# Patient Record
Sex: FEMALE | NOT HISPANIC OR LATINO | Employment: UNEMPLOYED
[De-identification: names, ages, dates, MRNs, and addresses within clinical notes are randomized per-mention and may not be internally consistent; named-entity substitution may affect disease eponyms.]

---

## 2018-02-22 ENCOUNTER — HOSPITAL (OUTPATIENT)
Dept: OTHER | Age: 38
End: 2018-02-22
Attending: LEGAL MEDICINE

## 2018-04-02 ENCOUNTER — HOSPITAL (OUTPATIENT)
Dept: OTHER | Age: 38
End: 2018-04-02
Attending: LEGAL MEDICINE

## 2018-04-02 LAB
ANALYZER ANC (IANC): ABNORMAL
BASOPHILS # BLD: 0 THOUSAND/MCL (ref 0–0.3)
BASOPHILS NFR BLD: 0 %
DIFFERENTIAL METHOD BLD: ABNORMAL
EOSINOPHIL # BLD: 0.2 THOUSAND/MCL (ref 0.1–0.5)
EOSINOPHIL NFR BLD: 1 %
ERYTHROCYTE [DISTWIDTH] IN BLOOD: 14 % (ref 11–15)
HEMATOCRIT: 37.7 % (ref 36–46.5)
HGB BLD-MCNC: 12.4 GM/DL (ref 12–15.5)
LYMPHOCYTES # BLD: 2.3 THOUSAND/MCL (ref 1–4.8)
LYMPHOCYTES NFR BLD: 14 %
MCH RBC QN AUTO: 28.6 PG (ref 26–34)
MCHC RBC AUTO-ENTMCNC: 32.9 GM/DL (ref 32–36.5)
MCV RBC AUTO: 86.9 FL (ref 78–100)
MONOCYTES # BLD: 0.7 THOUSAND/MCL (ref 0.3–0.9)
MONOCYTES NFR BLD: 5 %
NEUTROPHILS # BLD: 13 THOUSAND/MCL (ref 1.8–7.7)
NEUTROPHILS NFR BLD: 80 %
NEUTS SEG NFR BLD: ABNORMAL %
PERCENT NRBC: ABNORMAL
PLATELET # BLD: 347 THOUSAND/MCL (ref 140–450)
RBC # BLD: 4.34 MILLION/MCL (ref 4–5.2)
WBC # BLD: 16.2 THOUSAND/MCL (ref 4.2–11)

## 2018-04-04 LAB
ANALYZER ANC (IANC): ABNORMAL
ERYTHROCYTE [DISTWIDTH] IN BLOOD: 14.4 % (ref 11–15)
HEMATOCRIT: 31.6 % (ref 36–46.5)
HGB BLD-MCNC: 10.1 GM/DL (ref 12–15.5)
MCH RBC QN AUTO: 28.5 PG (ref 26–34)
MCHC RBC AUTO-ENTMCNC: 32 GM/DL (ref 32–36.5)
MCV RBC AUTO: 89 FL (ref 78–100)
PERCENT NRBC: ABNORMAL
PLATELET # BLD: 290 THOUSAND/MCL (ref 140–450)
RBC # BLD: 3.55 MILLION/MCL (ref 4–5.2)
WBC # BLD: 16.2 THOUSAND/MCL (ref 4.2–11)

## 2018-07-12 ENCOUNTER — HOSPITAL (OUTPATIENT)
Dept: OTHER | Age: 38
End: 2018-07-12
Attending: OBSTETRICS & GYNECOLOGY

## 2018-08-01 ENCOUNTER — HOSPITAL (OUTPATIENT)
Dept: OTHER | Age: 38
End: 2018-08-01
Attending: OBSTETRICS & GYNECOLOGY

## 2018-09-01 ENCOUNTER — HOSPITAL (OUTPATIENT)
Dept: OTHER | Age: 38
End: 2018-09-01
Attending: OBSTETRICS & GYNECOLOGY

## 2018-10-01 ENCOUNTER — HOSPITAL (OUTPATIENT)
Dept: OTHER | Age: 38
End: 2018-10-01
Attending: OBSTETRICS & GYNECOLOGY

## 2018-10-05 ENCOUNTER — HOSPITAL (OUTPATIENT)
Dept: OTHER | Age: 38
End: 2018-10-05
Attending: FAMILY MEDICINE

## 2018-10-05 ENCOUNTER — IMAGING SERVICES (OUTPATIENT)
Dept: OTHER | Age: 38
End: 2018-10-05

## 2019-01-18 ENCOUNTER — WALK IN (OUTPATIENT)
Dept: URGENT CARE | Age: 39
End: 2019-01-18

## 2019-01-18 VITALS
DIASTOLIC BLOOD PRESSURE: 78 MMHG | HEIGHT: 64 IN | WEIGHT: 190 LBS | SYSTOLIC BLOOD PRESSURE: 118 MMHG | TEMPERATURE: 98.7 F | BODY MASS INDEX: 32.44 KG/M2

## 2019-01-18 DIAGNOSIS — J11.1 INFLUENZA: Primary | ICD-10-CM

## 2019-01-18 LAB
FLUAV AG UPPER RESP QL IA.RAPID: NEGATIVE
FLUBV AG UPPER RESP QL IA.RAPID: POSITIVE

## 2019-01-18 PROCEDURE — 99201 OFFICE/OUTPT VISIT,NEW,LEVL I: CPT | Performed by: NURSE PRACTITIONER

## 2019-01-18 PROCEDURE — 87804 INFLUENZA ASSAY W/OPTIC: CPT | Performed by: NURSE PRACTITIONER

## 2019-01-18 RX ORDER — LEVOTHYROXINE SODIUM 0.05 MG/1
50 TABLET ORAL DAILY
COMMUNITY

## 2019-01-18 ASSESSMENT — ENCOUNTER SYMPTOMS
VOMITING: 0
SORE THROAT: 0
RHINORRHEA: 0
ABDOMINAL PAIN: 0
WEAKNESS: 0
COUGH: 0
WHEEZING: 0
SINUS PRESSURE: 0
NAUSEA: 0
NERVOUS/ANXIOUS: 0
EYE REDNESS: 0
SINUS PAIN: 0
EYE DISCHARGE: 0
EYE ITCHING: 0
WOUND: 0
DIARRHEA: 0
HEADACHES: 0
SLEEP DISTURBANCE: 0
FEVER: 1
DIZZINESS: 0
SHORTNESS OF BREATH: 0
EYE PAIN: 0
CHILLS: 1
FATIGUE: 1

## 2019-11-21 ENCOUNTER — OFFICE VISIT (OUTPATIENT)
Dept: INTEGRATIVE MEDICINE | Facility: CLINIC | Age: 39
End: 2019-11-21
Payer: COMMERCIAL

## 2019-11-21 VITALS
DIASTOLIC BLOOD PRESSURE: 76 MMHG | SYSTOLIC BLOOD PRESSURE: 116 MMHG | HEIGHT: 64 IN | WEIGHT: 183.63 LBS | BODY MASS INDEX: 31.35 KG/M2 | HEART RATE: 64 BPM | OXYGEN SATURATION: 98 %

## 2019-11-21 DIAGNOSIS — R79.89 ELEVATED LFTS: ICD-10-CM

## 2019-11-21 DIAGNOSIS — E03.9 HYPOTHYROIDISM, UNSPECIFIED TYPE: ICD-10-CM

## 2019-11-21 DIAGNOSIS — E23.6 PITUITARY MASS (HCC): ICD-10-CM

## 2019-11-21 DIAGNOSIS — E66.9 OBESITY (BMI 30-39.9): ICD-10-CM

## 2019-11-21 DIAGNOSIS — R51.9 PRESSURE IN HEAD: Primary | ICD-10-CM

## 2019-11-21 PROCEDURE — 99204 OFFICE O/P NEW MOD 45 MIN: CPT | Performed by: FAMILY MEDICINE

## 2019-11-21 RX ORDER — LEVOTHYROXINE SODIUM 0.05 MG/1
50 TABLET ORAL
COMMUNITY
End: 2019-12-09

## 2019-11-21 NOTE — PROGRESS NOTES
Juancarlos Castellon is a 46year old female. Patient presents with:  Establish Care      HPI:     Having chronic head pressure.    Had CT and MRI in 2015  Seen by ophtho and told that she hs \"squiggly blood vessels\"  Had a small growth on pituitary on her MRI Days per week: Not on file        Minutes per session: Not on file      Stress: Not on file    Relationships      Social connections:        Talks on phone: Not on file        Gets together: Not on file        Attends Orthodox service: Not on file D, 25-HYDROXY; Future  - ASSAY, THYROID STIM HORMONE; Future  - FREE T4 (FREE THYROXINE); Future  - THYROID PEROXIDASE (TPO) AB; Future  - THYROID ANTITHYROGLOBULIN AB; Future  - FREE T3 (TRIIODOTHYRONINE); Future  - COMP METABOLIC PANEL (14);  Future  - CA

## 2019-12-04 ENCOUNTER — APPOINTMENT (OUTPATIENT)
Dept: LAB | Facility: HOSPITAL | Age: 39
End: 2019-12-04
Attending: FAMILY MEDICINE
Payer: COMMERCIAL

## 2019-12-04 DIAGNOSIS — R51.9 PRESSURE IN HEAD: ICD-10-CM

## 2019-12-04 DIAGNOSIS — E03.9 HYPOTHYROIDISM, UNSPECIFIED TYPE: ICD-10-CM

## 2019-12-04 DIAGNOSIS — R79.89 ELEVATED LFTS: ICD-10-CM

## 2019-12-04 DIAGNOSIS — E66.9 OBESITY (BMI 30-39.9): ICD-10-CM

## 2019-12-04 DIAGNOSIS — E23.6 PITUITARY MASS (HCC): ICD-10-CM

## 2019-12-04 PROCEDURE — 86376 MICROSOMAL ANTIBODY EACH: CPT

## 2019-12-04 PROCEDURE — 83036 HEMOGLOBIN GLYCOSYLATED A1C: CPT

## 2019-12-04 PROCEDURE — 84146 ASSAY OF PROLACTIN: CPT

## 2019-12-04 PROCEDURE — 36415 COLL VENOUS BLD VENIPUNCTURE: CPT

## 2019-12-04 PROCEDURE — 86628 CANDIDA ANTIBODY: CPT

## 2019-12-04 PROCEDURE — 82306 VITAMIN D 25 HYDROXY: CPT

## 2019-12-04 PROCEDURE — 84443 ASSAY THYROID STIM HORMONE: CPT

## 2019-12-04 PROCEDURE — 84439 ASSAY OF FREE THYROXINE: CPT

## 2019-12-04 PROCEDURE — 80053 COMPREHEN METABOLIC PANEL: CPT

## 2019-12-04 PROCEDURE — 84481 FREE ASSAY (FT-3): CPT

## 2019-12-04 PROCEDURE — 86800 THYROGLOBULIN ANTIBODY: CPT

## 2019-12-05 ENCOUNTER — PATIENT MESSAGE (OUTPATIENT)
Dept: INTEGRATIVE MEDICINE | Facility: CLINIC | Age: 39
End: 2019-12-05

## 2019-12-09 RX ORDER — LEVOTHYROXINE SODIUM 0.05 MG/1
50 TABLET ORAL
Qty: 90 TABLET | Refills: 1 | Status: SHIPPED | OUTPATIENT
Start: 2019-12-09 | End: 2020-06-16

## 2019-12-12 ENCOUNTER — LAB ENCOUNTER (OUTPATIENT)
Dept: LAB | Facility: REFERENCE LAB | Age: 39
End: 2019-12-12
Attending: PHYSICIAN ASSISTANT
Payer: COMMERCIAL

## 2019-12-12 ENCOUNTER — OFFICE VISIT (OUTPATIENT)
Dept: INTEGRATIVE MEDICINE | Facility: CLINIC | Age: 39
End: 2019-12-12
Payer: COMMERCIAL

## 2019-12-12 VITALS
OXYGEN SATURATION: 97 % | HEART RATE: 69 BPM | TEMPERATURE: 98 F | DIASTOLIC BLOOD PRESSURE: 76 MMHG | BODY MASS INDEX: 31.14 KG/M2 | SYSTOLIC BLOOD PRESSURE: 132 MMHG | HEIGHT: 64 IN | WEIGHT: 182.38 LBS

## 2019-12-12 DIAGNOSIS — Z3A.01 LESS THAN 8 WEEKS GESTATION OF PREGNANCY: ICD-10-CM

## 2019-12-12 DIAGNOSIS — E03.9 HYPOTHYROIDISM, UNSPECIFIED TYPE: ICD-10-CM

## 2019-12-12 DIAGNOSIS — Z3A.01 LESS THAN 8 WEEKS GESTATION OF PREGNANCY: Primary | ICD-10-CM

## 2019-12-12 PROCEDURE — 99213 OFFICE O/P EST LOW 20 MIN: CPT | Performed by: PHYSICIAN ASSISTANT

## 2019-12-12 PROCEDURE — 84702 CHORIONIC GONADOTROPIN TEST: CPT

## 2019-12-12 PROCEDURE — 36415 COLL VENOUS BLD VENIPUNCTURE: CPT

## 2019-12-12 NOTE — PROGRESS NOTES
Portillo Raines is a 46year old female. Patient presents with:  Pregnancy: referral for ob       HPI:   Patient believes she is 6 weeks pregnant. Unplanned. She would like a blood test to confrm. Just started Prenatal. Started fish oil, vitamin D.  She is Transportation needs:        Medical: Not on file        Non-medical: Not on file    Tobacco Use      Smoking status: Former Smoker        Types: Cigarettes        Quit date: 2006        Years since quittin.9      Smokeless tobacco: Never Used    [employer] regular rhythm and normal heart sounds. No murmur heard. Pulmonary/Chest: Effort normal and breath sounds normal.   Abdominal: Soft. Bowel sounds are normal. There is no tenderness. There is no guarding.    Lymphadenopathy:     She has no cervical adenop

## 2019-12-12 NOTE — PATIENT INSTRUCTIONS
Clay Stevenson      [facility] (delivers our of [facility])      Have thyroid labs done in 2-3 weeks.

## 2019-12-18 PROBLEM — O09.529 ANTEPARTUM MULTIGRAVIDA OF ADVANCED MATERNAL AGE (HCC): Status: ACTIVE | Noted: 2019-12-18

## 2019-12-18 PROBLEM — O09.529 ANTEPARTUM MULTIGRAVIDA OF ADVANCED MATERNAL AGE: Status: ACTIVE | Noted: 2019-12-18

## 2019-12-26 ENCOUNTER — TELEPHONE (OUTPATIENT)
Dept: INTEGRATIVE MEDICINE | Facility: CLINIC | Age: 39
End: 2019-12-26

## 2019-12-26 DIAGNOSIS — Z3A.09 9 WEEKS GESTATION OF PREGNANCY: Primary | ICD-10-CM

## 2019-12-26 DIAGNOSIS — R51.9 PRESSURE IN HEAD: ICD-10-CM

## 2019-12-26 NOTE — TELEPHONE ENCOUNTER
Patient is asking for a order for MRI of Pituitary without contrast as she is 9 weeks pregnant. [facility].  RN spoke with Gabi Arteaga and will re pend order for Dr Jing Ziegler approval. Gabi Arteaga has requested clincal notes that will be s

## 2020-01-06 ENCOUNTER — LAB ENCOUNTER (OUTPATIENT)
Dept: LAB | Age: 40
End: 2020-01-06
Attending: OBSTETRICS & GYNECOLOGY
Payer: COMMERCIAL

## 2020-01-06 ENCOUNTER — OFFICE VISIT (OUTPATIENT)
Dept: INTEGRATIVE MEDICINE | Facility: CLINIC | Age: 40
End: 2020-01-06
Payer: COMMERCIAL

## 2020-01-06 VITALS
BODY MASS INDEX: 31.1 KG/M2 | HEART RATE: 82 BPM | DIASTOLIC BLOOD PRESSURE: 80 MMHG | HEIGHT: 64 IN | SYSTOLIC BLOOD PRESSURE: 115 MMHG | WEIGHT: 182.19 LBS | TEMPERATURE: 100 F

## 2020-01-06 DIAGNOSIS — R52 BODY ACHES: ICD-10-CM

## 2020-01-06 DIAGNOSIS — O09.521 MULTIGRAVIDA OF ADVANCED MATERNAL AGE IN FIRST TRIMESTER: ICD-10-CM

## 2020-01-06 DIAGNOSIS — E03.9 HYPOTHYROIDISM, UNSPECIFIED TYPE: ICD-10-CM

## 2020-01-06 DIAGNOSIS — R50.9 FEVER, UNSPECIFIED FEVER CAUSE: Primary | ICD-10-CM

## 2020-01-06 LAB
BASOPHILS # BLD AUTO: 0.02 X10(3) UL (ref 0–0.2)
BASOPHILS NFR BLD AUTO: 0.4 %
DEPRECATED RDW RBC AUTO: 41.1 FL (ref 35.1–46.3)
EOSINOPHIL # BLD AUTO: 0.09 X10(3) UL (ref 0–0.7)
EOSINOPHIL NFR BLD AUTO: 1.8 %
ERYTHROCYTE [DISTWIDTH] IN BLOOD BY AUTOMATED COUNT: 12.8 % (ref 11–15)
HBV SURFACE AG SER-ACNC: <0.1 [IU]/L
HBV SURFACE AG SERPL QL IA: NONREACTIVE
HCT VFR BLD AUTO: 37.1 % (ref 35–48)
HGB BLD-MCNC: 11.9 G/DL (ref 12–16)
IMM GRANULOCYTES # BLD AUTO: 0.01 X10(3) UL (ref 0–1)
IMM GRANULOCYTES NFR BLD: 0.2 %
LYMPHOCYTES # BLD AUTO: 1.18 X10(3) UL (ref 1–4)
LYMPHOCYTES NFR BLD AUTO: 23.2 %
MCH RBC QN AUTO: 28.3 PG (ref 26–34)
MCHC RBC AUTO-ENTMCNC: 32.1 G/DL (ref 31–37)
MCV RBC AUTO: 88.1 FL (ref 80–100)
MONOCYTES # BLD AUTO: 0.41 X10(3) UL (ref 0.1–1)
MONOCYTES NFR BLD AUTO: 8.1 %
NEUTROPHILS # BLD AUTO: 3.38 X10 (3) UL (ref 1.5–7.7)
NEUTROPHILS # BLD AUTO: 3.38 X10(3) UL (ref 1.5–7.7)
NEUTROPHILS NFR BLD AUTO: 66.3 %
PLATELET # BLD AUTO: 282 10(3)UL (ref 150–450)
RBC # BLD AUTO: 4.21 X10(6)UL (ref 3.8–5.3)
RUBV IGG SER QL: POSITIVE
RUBV IGG SER-ACNC: 76.8 IU/ML (ref 10–?)
T3FREE SERPL-MCNC: 1.88 PG/ML (ref 2.4–4.2)
WBC # BLD AUTO: 5.1 X10(3) UL (ref 4–11)

## 2020-01-06 PROCEDURE — 87581 M.PNEUMON DNA AMP PROBE: CPT | Performed by: PHYSICIAN ASSISTANT

## 2020-01-06 PROCEDURE — 36415 COLL VENOUS BLD VENIPUNCTURE: CPT

## 2020-01-06 PROCEDURE — 85025 COMPLETE CBC W/AUTO DIFF WBC: CPT

## 2020-01-06 PROCEDURE — 87389 HIV-1 AG W/HIV-1&-2 AB AG IA: CPT

## 2020-01-06 PROCEDURE — 87633 RESP VIRUS 12-25 TARGETS: CPT | Performed by: PHYSICIAN ASSISTANT

## 2020-01-06 PROCEDURE — 87798 DETECT AGENT NOS DNA AMP: CPT | Performed by: PHYSICIAN ASSISTANT

## 2020-01-06 PROCEDURE — 87486 CHLMYD PNEUM DNA AMP PROBE: CPT | Performed by: PHYSICIAN ASSISTANT

## 2020-01-06 PROCEDURE — 86780 TREPONEMA PALLIDUM: CPT

## 2020-01-06 PROCEDURE — 99213 OFFICE O/P EST LOW 20 MIN: CPT | Performed by: PHYSICIAN ASSISTANT

## 2020-01-06 PROCEDURE — 83036 HEMOGLOBIN GLYCOSYLATED A1C: CPT | Performed by: OBSTETRICS & GYNECOLOGY

## 2020-01-06 PROCEDURE — 84481 FREE ASSAY (FT-3): CPT

## 2020-01-06 PROCEDURE — 86762 RUBELLA ANTIBODY: CPT

## 2020-01-06 PROCEDURE — 84439 ASSAY OF FREE THYROXINE: CPT | Performed by: OBSTETRICS & GYNECOLOGY

## 2020-01-06 PROCEDURE — 84443 ASSAY THYROID STIM HORMONE: CPT | Performed by: OBSTETRICS & GYNECOLOGY

## 2020-01-06 PROCEDURE — 87340 HEPATITIS B SURFACE AG IA: CPT

## 2020-01-06 NOTE — PROGRESS NOTES
Zak Norman is a 45year old female. Patient presents with:  Cough: fever   Allergies  Pregnancy: 10 weeks       HPI:   Started Friday with a fever. Took tylenol and fever would not go down. Highest was 101.5. Has a cough and body aches. Very tired. resource strain: Not on file      Food insecurity:        Worry: Not on file        Inability: Not on file      Transportation needs:        Medical: Not on file        Non-medical: Not on file    Tobacco Use      Smoking status: Former Smoker        Types are normal. Right eye exhibits no discharge. Left eye exhibits no discharge. Neck: Normal range of motion. Neck supple. No thyromegaly present. Cardiovascular: Normal rate, regular rhythm and normal heart sounds. No murmur heard.   Pulmonary/Chest: Ef

## 2020-01-07 ENCOUNTER — TELEPHONE (OUTPATIENT)
Dept: INTEGRATIVE MEDICINE | Facility: CLINIC | Age: 40
End: 2020-01-07

## 2020-01-07 LAB

## 2020-01-07 NOTE — PROGRESS NOTES
Jaquan Fairview you are positive for flu. Flu is a virus and no antibiotics are needed. Continue supportive care at home.      Juliana Frederick PA-C

## 2020-01-07 NOTE — TELEPHONE ENCOUNTER
Called patient to let her know that she is positive for flu and to continue supportive care at home. Patient states that her fever last night was up to 103. It came down with Tylenol.  Encouraged the patient to continue taking tylenol to help keep the fever

## 2020-01-08 LAB — T PALLIDUM AB SER QL: NEGATIVE

## 2020-02-20 ENCOUNTER — OFFICE VISIT (OUTPATIENT)
Dept: INTEGRATIVE MEDICINE | Facility: CLINIC | Age: 40
End: 2020-02-20
Payer: COMMERCIAL

## 2020-02-20 VITALS
BODY MASS INDEX: 31.86 KG/M2 | HEIGHT: 64 IN | WEIGHT: 186.63 LBS | DIASTOLIC BLOOD PRESSURE: 78 MMHG | SYSTOLIC BLOOD PRESSURE: 122 MMHG | OXYGEN SATURATION: 99 % | HEART RATE: 78 BPM

## 2020-02-20 DIAGNOSIS — K59.00 CONSTIPATION, UNSPECIFIED CONSTIPATION TYPE: ICD-10-CM

## 2020-02-20 DIAGNOSIS — Z3A.17 17 WEEKS GESTATION OF PREGNANCY: ICD-10-CM

## 2020-02-20 DIAGNOSIS — N81.9 FEMALE GENITAL PROLAPSE, UNSPECIFIED TYPE: ICD-10-CM

## 2020-02-20 DIAGNOSIS — E66.9 OBESITY (BMI 30-39.9): ICD-10-CM

## 2020-02-20 DIAGNOSIS — E23.6 PITUITARY MASS (HCC): ICD-10-CM

## 2020-02-20 DIAGNOSIS — Z00.00 ROUTINE MEDICAL EXAM: Primary | ICD-10-CM

## 2020-02-20 DIAGNOSIS — R51.9 PRESSURE IN HEAD: ICD-10-CM

## 2020-02-20 DIAGNOSIS — E03.9 HYPOTHYROIDISM, UNSPECIFIED TYPE: ICD-10-CM

## 2020-02-20 DIAGNOSIS — K21.9 GASTROESOPHAGEAL REFLUX DISEASE, ESOPHAGITIS PRESENCE NOT SPECIFIED: ICD-10-CM

## 2020-02-20 PROCEDURE — 99395 PREV VISIT EST AGE 18-39: CPT | Performed by: FAMILY MEDICINE

## 2020-02-20 NOTE — PROGRESS NOTES
Ajay Bertrand is a 45year old female. Patient presents with:  Physical      HPI:     Currently 17 WGA, due date is 7/31/20  Gained 5 lbs since her pregnancy. Eating more sweets than before. Sleep is ok. Two still wakes her up.    Feels pelvic organs Prenatal Vit-Fe Sulfate-FA (PRENATAL MULTIVIT-IRON OR) Take by mouth.      • Levothyroxine Sodium 50 MCG Oral Tab Take 1 tablet (50 mcg total) by mouth before breakfast. 90 tablet 1       SOCIAL HISTORY:   Social History    Socioeconomic History      Bhumika 122/78   Pulse: 78   SpO2: 99%   Weight: 186 lb 9.6 oz (84.6 kg)   Height: 64\"       Physical Exam   Constitutional: She is oriented to person, place, and time and well-developed, well-nourished, and in no distress. No distress.    HENT:   Head: Normocepha with OB    Given further recommendations as below. Orders Placed This Visit:  No orders of the defined types were placed in this encounter. Patient Instructions   I have complete sameera in the body's ability to heal and transform.     The products an local grocery and health food stores. Psyllium Husk  -- 4 psyllium husk capsules  [both can be found at [employer] or [employer]]      Directions:  Take with warm water before bedtime      Prenatal Vitamin Brands: New Chapter    Restore  A liquid mcqueen

## 2020-02-20 NOTE — PATIENT INSTRUCTIONS
I have complete sameera in the body's ability to heal and transform. The products and items listed below (the “Products”)  and their claims have not been evaluated by the Food and Drug Administration.  Dietary products are not intended to treat, prevent, m water before bedtime      Prenatal Vitamin Brands: New Chapter    Restore  A liquid supplement for gut health. This is a carbon, soil-based product that helps to rebuild the tight junctions, or important connections between cells, in the intestines.  These

## 2020-03-11 ENCOUNTER — TELEPHONE (OUTPATIENT)
Dept: INTEGRATIVE MEDICINE | Facility: CLINIC | Age: 40
End: 2020-03-11

## 2020-06-02 ENCOUNTER — OFFICE VISIT (OUTPATIENT)
Dept: PHYSICAL THERAPY | Facility: HOSPITAL | Age: 40
End: 2020-06-02
Attending: FAMILY MEDICINE
Payer: COMMERCIAL

## 2020-06-02 DIAGNOSIS — N81.9 FEMALE GENITAL PROLAPSE, UNSPECIFIED TYPE: ICD-10-CM

## 2020-06-02 PROCEDURE — 97535 SELF CARE MNGMENT TRAINING: CPT

## 2020-06-02 PROCEDURE — 97161 PT EVAL LOW COMPLEX 20 MIN: CPT

## 2020-06-02 PROCEDURE — 97140 MANUAL THERAPY 1/> REGIONS: CPT

## 2020-06-02 NOTE — PROGRESS NOTES
PELVIC FLOOR EVALUATION:   Referring Physician: Dr. Willis Walker  Diagnosis: Female genital prolapse, unspecified type (N81.9      Date of Onset: 2 years   Date of Service: 6/2/2020     PATIENT SUMMARY   Jo Wellington is a 45year old female who presents to heaviness/pressure     LBP: 2/10 - 7/10  Description: dull/ache     Occupation/Activities:    Living Situation: house with 2 children and      PFDI-20  Not captured due to Shira Fudge.      PMH was discussed/reviewed with patient and per (just during current pregnancy otherwise none). Reports presence of MIRZA (cough/laugh/sneeze, intercourse), occurring 3-4x/week, small drops to gush in amount. Reports occasional constipation, noting discomfort/bleeding with BMs when hemorrhoid irritated.  R Power/Endurance/Reps/Fast) MMT: 2/3/4/NT  Accessory Muscle Use: gluteals, abdominals      Today's Treatment and Response/Education:   Patient education provided on female pelvic floor anatomy via pelvic floor model/illustrations, bowel/bladder/sexual funct 7/31/20    This plan/goals was discussed and agreed upon by: patient    Patient was advised of these findings, precautions, and treatment options and has agreed to actively participate in planning and for this course of care.      Thank you for your referra

## 2020-06-10 ENCOUNTER — OFFICE VISIT (OUTPATIENT)
Dept: PHYSICAL THERAPY | Facility: HOSPITAL | Age: 40
End: 2020-06-10
Attending: FAMILY MEDICINE
Payer: COMMERCIAL

## 2020-06-10 DIAGNOSIS — N81.9 FEMALE GENITAL PROLAPSE, UNSPECIFIED TYPE: ICD-10-CM

## 2020-06-10 PROCEDURE — 97140 MANUAL THERAPY 1/> REGIONS: CPT

## 2020-06-10 NOTE — PROGRESS NOTES
Dx:  Female genital prolapse, unspecified type (N81.9              Authorized # of Visits/Insurance:  [facility] (20 visit limit) Script Dates: 6/2/20 - 7/31/20           Next MD visit: none scheduled  Referring MD: Tony Naidu  Fall Risk:  standard previous level of function. 2. Patient to increase global proximal hip strength 1/2 MMT grade in order to sit/stand for 1 hour or more for ADLs/work related tasks.    3. Patient to demonstrate improved pelvic floor strength to at least 3/5 MMT grade for

## 2020-06-16 ENCOUNTER — TELEPHONE (OUTPATIENT)
Dept: PHYSICAL THERAPY | Facility: HOSPITAL | Age: 40
End: 2020-06-16

## 2020-06-16 RX ORDER — LEVOTHYROXINE SODIUM 0.05 MG/1
TABLET ORAL
Qty: 90 TABLET | Refills: 1 | Status: SHIPPED | OUTPATIENT
Start: 2020-06-16 | End: 2020-12-03

## 2020-06-23 ENCOUNTER — OFFICE VISIT (OUTPATIENT)
Dept: PHYSICAL THERAPY | Facility: HOSPITAL | Age: 40
End: 2020-06-23
Attending: FAMILY MEDICINE
Payer: COMMERCIAL

## 2020-06-23 DIAGNOSIS — N81.9 FEMALE GENITAL PROLAPSE, UNSPECIFIED TYPE: ICD-10-CM

## 2020-06-23 PROCEDURE — 97140 MANUAL THERAPY 1/> REGIONS: CPT

## 2020-06-23 NOTE — PROGRESS NOTES
Dx:  Female genital prolapse, unspecified type (N81.9              Authorized # of Visits/Insurance:  [facility] (20 visit limit) Script Dates: 6/2/20 - 7/31/20           Next MD visit: none scheduled  Referring MD: Andrez Reagan  Fall Risk:  standard Term Goals Timeframe (8 weeks, 6/2/20 - 7/31/20)  1. Patient to be independent with progressive HEP during and upon discharge to aide with symptom management and return to previous level of function.     2. Patient to increase global proximal hip strength 1

## 2020-06-30 ENCOUNTER — APPOINTMENT (OUTPATIENT)
Dept: PHYSICAL THERAPY | Facility: HOSPITAL | Age: 40
End: 2020-06-30
Attending: FAMILY MEDICINE
Payer: COMMERCIAL

## 2020-07-02 ENCOUNTER — TELEMEDICINE (OUTPATIENT)
Dept: INTEGRATIVE MEDICINE | Facility: CLINIC | Age: 40
End: 2020-07-02
Payer: COMMERCIAL

## 2020-07-02 DIAGNOSIS — J45.21 MILD INTERMITTENT ASTHMA WITH ACUTE EXACERBATION: Primary | ICD-10-CM

## 2020-07-02 PROCEDURE — 99213 OFFICE O/P EST LOW 20 MIN: CPT | Performed by: PHYSICIAN ASSISTANT

## 2020-07-02 RX ORDER — ALBUTEROL SULFATE 90 UG/1
2 AEROSOL, METERED RESPIRATORY (INHALATION) EVERY 6 HOURS PRN
Qty: 1 INHALER | Refills: 0 | Status: SHIPPED | OUTPATIENT
Start: 2020-07-02 | End: 2020-07-23

## 2020-07-02 NOTE — PROGRESS NOTES
Yancy Rascon is a 45year old female. Patient presents with:  Cough      HPI:   Patient presents with concern for Cough at night and in the morning. She is also having some wheezing.  Asthma in the past. Using an old inhaler that she found which is he file      Highest education level: Not on file    Occupational History      Not on file    Social Needs      Financial resource strain: Not on file      Food insecurity:        Worry: Not on file        Inability: Not on file      Transportation needs: Mood and affect normal.       ASSESSMENT AND PLAN:     1. Mild intermittent asthma with acute exacerbation  - Albuterol Sulfate HFA (VENTOLIN HFA) 108 (90 Base) MCG/ACT Inhalation Aero Soln;  Inhale 2 puffs into the lungs every 6 (six) hours as needed for W

## 2020-07-02 NOTE — PATIENT INSTRUCTIONS
Look into HEPA air filter to help with allergens and make sure that filters are changed in A/C unit. Avoid wearing shoes in the house. Vacuum and dust regularly. Clean sheets and pillowcases regularly.

## 2020-07-14 ENCOUNTER — APPOINTMENT (OUTPATIENT)
Dept: PHYSICAL THERAPY | Facility: HOSPITAL | Age: 40
End: 2020-07-14
Attending: FAMILY MEDICINE
Payer: COMMERCIAL

## 2020-07-23 DIAGNOSIS — J45.21 MILD INTERMITTENT ASTHMA WITH ACUTE EXACERBATION: ICD-10-CM

## 2020-07-23 NOTE — TELEPHONE ENCOUNTER
A refill request was received for:  Requested Prescriptions     Pending Prescriptions Disp Refills   • VENTOLIN  (90 Base) MCG/ACT Inhalation Aero Soln [Pharmacy Med Name: VENTOLIN HFA INH W/DOS CTR 200PUFFS] 18 g 0     Sig: INHALE 2 PUFFS INTO THE

## 2020-10-06 ENCOUNTER — TELEPHONE (OUTPATIENT)
Dept: INTEGRATIVE MEDICINE | Facility: CLINIC | Age: 40
End: 2020-10-06

## 2020-10-06 NOTE — TELEPHONE ENCOUNTER
Ismael, with [facility], fax [phone] asking for MRI order to be sent over.  RN has faxed order

## 2020-10-06 NOTE — TELEPHONE ENCOUNTER
Beckie Ramírez,   [facility]   [phone]  O# [phone]  F# [phone]  C# [phone]   [email or web address]  [email or web address]     Representative came into clinic in-person requesting inform

## 2020-10-08 ENCOUNTER — MED REC SCAN ONLY (OUTPATIENT)
Dept: INTEGRATIVE MEDICINE | Facility: CLINIC | Age: 40
End: 2020-10-08

## 2020-10-12 ENCOUNTER — MED REC SCAN ONLY (OUTPATIENT)
Dept: INTEGRATIVE MEDICINE | Facility: CLINIC | Age: 40
End: 2020-10-12

## 2020-10-13 ENCOUNTER — TELEPHONE (OUTPATIENT)
Dept: INTEGRATIVE MEDICINE | Facility: CLINIC | Age: 40
End: 2020-10-13

## 2020-10-13 NOTE — TELEPHONE ENCOUNTER
Please contact patient to make a follow up visit to discuss her MRI results and determine the next best steps.

## 2020-10-14 NOTE — TELEPHONE ENCOUNTER
Spoke w/pt - she stated she got a copy from [facility] and informed her that Dr. Andrés Lawrence will go over it in detail at visit on Monday. MRI result sent to scanning.

## 2020-10-19 ENCOUNTER — TELEMEDICINE (OUTPATIENT)
Dept: INTEGRATIVE MEDICINE | Facility: CLINIC | Age: 40
End: 2020-10-19

## 2020-10-19 ENCOUNTER — MED REC SCAN ONLY (OUTPATIENT)
Dept: INTEGRATIVE MEDICINE | Facility: CLINIC | Age: 40
End: 2020-10-19

## 2020-10-19 DIAGNOSIS — N81.9 FEMALE GENITAL PROLAPSE, UNSPECIFIED TYPE: ICD-10-CM

## 2020-10-19 DIAGNOSIS — K59.00 CONSTIPATION, UNSPECIFIED CONSTIPATION TYPE: ICD-10-CM

## 2020-10-19 DIAGNOSIS — E03.9 HYPOTHYROIDISM, UNSPECIFIED TYPE: ICD-10-CM

## 2020-10-19 DIAGNOSIS — R51.9 PRESSURE IN HEAD: ICD-10-CM

## 2020-10-19 DIAGNOSIS — D35.2 PITUITARY ADENOMA (HCC): Primary | ICD-10-CM

## 2020-10-19 PROCEDURE — 99214 OFFICE O/P EST MOD 30 MIN: CPT | Performed by: FAMILY MEDICINE

## 2020-10-19 NOTE — PROGRESS NOTES
Luis Hudson is a 45year old female. Patient presents with: Follow - Up: discuss MRI results      HPI:     Still feeling head pressure, unchanged while going through pregnancy.    Seen by eye doctor 1 year ago and was told intraocular exam is abnormal not have insomnia.         FAMILY HISTORY:      Family History   Problem Relation Age of Onset   • Cancer Mother    • Pancreatic Cancer Mother        MEDICAL HISTORY:     Past Medical History:   Diagnosis Date   • Hypothyroidism        CURRENT MEDICATIONS: violence        Fear of current or ex partner: Not on file        Emotionally abused: Not on file        Physically abused: Not on file        Forced sexual activity: Not on file    Other Topics      Concerns:        Not on file    Social History Narrative file.    Patient affirmed understanding of plan and all questions were answered.      Mark Hernandez, DO

## 2020-10-20 ENCOUNTER — MED REC SCAN ONLY (OUTPATIENT)
Dept: INTEGRATIVE MEDICINE | Facility: CLINIC | Age: 40
End: 2020-10-20

## 2020-11-11 ENCOUNTER — TELEPHONE (OUTPATIENT)
Dept: INTEGRATIVE MEDICINE | Facility: CLINIC | Age: 40
End: 2020-11-11

## 2020-11-11 NOTE — TELEPHONE ENCOUNTER
Pt called and stated that she is having right handed tremors without provocation and would like to be seen by Dr Eliceo Alvarado as soon as possible. Pt stated that she could not get an appt through [facility] until January and would like this addressed soon.    Please advi

## 2020-11-13 NOTE — PROGRESS NOTES
Angie Tsai is a 45year old female. Patient presents with:   Tremors: L hand - x2 months   HPI:   Has a h/o head pressure, confusion, memory loss. Started with left hand tremor a few days ago. Lasted 30-60 min.    Has happened in the past, did no Refill   • VENTOLIN  (90 Base) MCG/ACT Inhalation Aero Soln INHALE 2 PUFFS INTO THE LUNGS EVERY 6 HOURS AS NEEDED FOR WHEEZING 18 g 0   • LEVOTHYROXINE SODIUM 50 MCG Oral Tab TAKE 1 TABLET BY MOUTH BEFORE BREAKFAST 90 tablet 1   • Prenatal Vit-Fe Morgan cure disease. Ultimately, you must draw your own conclusion as to the efficacy of the Product and immediately stop use of the Products if a negative reaction should arise.  You should always consult a licensed health care professional before starting any mcqueen mind may wander and that is ok! When you recognize that it is wandering, without judgement allow it to be ok and then refocus back to your breath. Use an rachael to help you.  A good free one is “Pranayama,” which provides sound prompts to help you follow you Ideally, sit with your back straight. Place the tip of your tongue against the ridge of tissue just behind your upper front teeth, and keep it there through the entire exercise.    Exhale through your mouth around your tongue; try pursing your lips slig

## 2020-11-13 NOTE — PATIENT INSTRUCTIONS
I have complete sameera in the body's ability to heal and transform. The products and items listed below (the “Products”)  and their claims have not been evaluated by the Food and Drug Administration.  Dietary products are not intended to treat, prevent, m comfortable position. · Just BREATHE! One of the easiest practices to do is to sit and pay attention to your breath. You can literally say to yourself, “I am breathing in” as you breathe in and “I am breathing out” as you breathe out.  Use these phrases to quieter and quieter until only mouthing the word. Then say quietly inside your head and then allow this word to pull you into complete relaxation. Prayer - Eileen Jones” or “Lord’s Prayer” or other prayer done in repetition using prayer beads.        4 - Mastery by Surekha Barragan    The Miracle of Mindfulness by [facility]

## 2020-11-14 ENCOUNTER — OFFICE VISIT (OUTPATIENT)
Dept: ENDOCRINOLOGY CLINIC | Facility: CLINIC | Age: 40
End: 2020-11-14
Payer: COMMERCIAL

## 2020-11-14 VITALS
BODY MASS INDEX: 32.44 KG/M2 | HEART RATE: 66 BPM | SYSTOLIC BLOOD PRESSURE: 143 MMHG | WEIGHT: 190 LBS | HEIGHT: 64 IN | DIASTOLIC BLOOD PRESSURE: 85 MMHG

## 2020-11-14 DIAGNOSIS — D35.2 PITUITARY ADENOMA (HCC): Primary | ICD-10-CM

## 2020-11-14 DIAGNOSIS — E03.9 HYPOTHYROIDISM, UNSPECIFIED TYPE: ICD-10-CM

## 2020-11-14 PROCEDURE — 3008F BODY MASS INDEX DOCD: CPT | Performed by: INTERNAL MEDICINE

## 2020-11-14 PROCEDURE — 3077F SYST BP >= 140 MM HG: CPT | Performed by: INTERNAL MEDICINE

## 2020-11-14 PROCEDURE — 3079F DIAST BP 80-89 MM HG: CPT | Performed by: INTERNAL MEDICINE

## 2020-11-14 PROCEDURE — 99203 OFFICE O/P NEW LOW 30 MIN: CPT | Performed by: INTERNAL MEDICINE

## 2020-11-14 NOTE — H&P
New Patient Evaluation - History and Physical    CONSULT - Reason for Visit:  Pituitary Microadenoma. Requesting Physician: self-referral.    CHIEF COMPLAINT:  Patient presents with:  Consult: consult for pituitary adenoma. PCP referred.  Patient states sh Negative for:  Visual changes, proptosis, blurring, but does have double vision  ENT: Negative for:  dysphagia, neck swelling, dysphonia  Respiratory: Negative for:  dyspnea, cough  Cardiovascular: Negative for:  chest pain, palpitations, orthopnea  GI: Ne about a month after she is done, which would be about September of next year.     - Check prolactin, ACTH, Cortisol, IGF-1 in one year  - Referral for ophthalmology for changes seen in retinal scan at optometrist's office. (the report mentioned tortuous ves

## 2020-11-16 ENCOUNTER — TELEPHONE (OUTPATIENT)
Dept: OPHTHALMOLOGY | Facility: CLINIC | Age: 40
End: 2020-11-16

## 2020-11-16 NOTE — TELEPHONE ENCOUNTER
Spoke with patient. She has been diagnosed with a pituitary adenoma and she needs a visual field and a dilated eye exam.   She is referred by her endocrinologist Dr. Stu Warren. She needs an appointment after 4 pm or a Saturday.   I offered her sooner rachael

## 2020-12-03 RX ORDER — LEVOTHYROXINE SODIUM 0.05 MG/1
TABLET ORAL
Qty: 90 TABLET | Refills: 1 | Status: SHIPPED | OUTPATIENT
Start: 2020-12-03 | End: 2021-05-23

## 2020-12-12 ENCOUNTER — TELEPHONE (OUTPATIENT)
Dept: ENDOCRINOLOGY CLINIC | Facility: CLINIC | Age: 40
End: 2020-12-12

## 2020-12-12 ENCOUNTER — NURSE ONLY (OUTPATIENT)
Dept: OPHTHALMOLOGY | Facility: CLINIC | Age: 40
End: 2020-12-12
Payer: COMMERCIAL

## 2020-12-12 ENCOUNTER — OFFICE VISIT (OUTPATIENT)
Dept: OPHTHALMOLOGY | Facility: CLINIC | Age: 40
End: 2020-12-12
Payer: COMMERCIAL

## 2020-12-12 DIAGNOSIS — D35.2 PITUITARY ADENOMA (HCC): Primary | ICD-10-CM

## 2020-12-12 DIAGNOSIS — H52.223 MYOPIA OF BOTH EYES WITH REGULAR ASTIGMATISM: ICD-10-CM

## 2020-12-12 DIAGNOSIS — H52.13 MYOPIA OF BOTH EYES WITH REGULAR ASTIGMATISM: ICD-10-CM

## 2020-12-12 PROCEDURE — 92015 DETERMINE REFRACTIVE STATE: CPT | Performed by: OPHTHALMOLOGY

## 2020-12-12 PROCEDURE — 99202 OFFICE O/P NEW SF 15 MIN: CPT | Performed by: OPHTHALMOLOGY

## 2020-12-12 PROCEDURE — 99203 OFFICE O/P NEW LOW 30 MIN: CPT | Performed by: OPHTHALMOLOGY

## 2020-12-12 PROCEDURE — 92083 EXTENDED VISUAL FIELD XM: CPT | Performed by: OPHTHALMOLOGY

## 2020-12-12 NOTE — PATIENT INSTRUCTIONS
Pituitary adenoma ([facility] [address]  Normal results of 120 pt visual field screening in both eyes were discussed with patient. Results are negative for a bitemporal field defect.    Will follow up with patient per neurosurgeon- Dr. Landry Ruth and Dr. Tenzin House

## 2020-12-12 NOTE — ASSESSMENT & PLAN NOTE
Normal results of 120 pt visual field screening in both eyes were discussed with patient. Results are negative for a bitemporal field defect. Will follow up with patient per neurosurgeon- Dr. Wong Zapata and Dr. Shaan Spann recommendations.

## 2020-12-12 NOTE — PROGRESS NOTES
Nuno Longoria is a 45year old female. HPI:     HPI     Patient is here for a 120 pt visual field screening due to diagnosis of pituitary adenoma.       Last edited by Martinez Muniz on 12/12/2020 11:21 AM. (History)        Patient History:  Past Medical

## 2020-12-12 NOTE — TELEPHONE ENCOUNTER
Patient seen by Dr. Marianela Ng- for evaluation of visual field defect. No bitemporal visual field defect noted.

## 2020-12-12 NOTE — PROGRESS NOTES
Maribeth Velasco is a 45year old female. HPI:     HPI     Consult      Additional comments:  Referred by Dr. Kristina Medina              Comments     Here for a VF and dilated eye exam. Pt was diagnosed with a pituitary adenoma in October 2020. Used    Alcohol use: Not Currently      Frequency: 2-4 times a month    Drug use: Never      Medications:  Current Outpatient Medications   Medication Sig Dispense Refill   • LEVOTHYROXINE SODIUM 50 MCG Oral Tab TAKE 1 TABLET BY MOUTH BEFORE BREAKFAST 90 t Wearing Rx       Sphere Cylinder Axis    Right -1.00 +0.50 065    Left -0.25 +0.50 155    Type: Single vision          Manifest Refraction       Sphere Cylinder [facility]    Right -1.00 +0.50 070 20/20    Left -0.75 +0.25 155 20/20          Final Rx

## 2021-03-06 ENCOUNTER — OFFICE VISIT (OUTPATIENT)
Dept: FAMILY MEDICINE CLINIC | Facility: CLINIC | Age: 41
End: 2021-03-06
Payer: COMMERCIAL

## 2021-03-06 VITALS
DIASTOLIC BLOOD PRESSURE: 72 MMHG | SYSTOLIC BLOOD PRESSURE: 130 MMHG | BODY MASS INDEX: 32.03 KG/M2 | WEIGHT: 187.63 LBS | HEIGHT: 64 IN

## 2021-03-06 DIAGNOSIS — Z12.31 SCREENING MAMMOGRAM, ENCOUNTER FOR: ICD-10-CM

## 2021-03-06 DIAGNOSIS — N63.22 BREAST LUMP ON LEFT SIDE AT 11 O'CLOCK POSITION: Primary | ICD-10-CM

## 2021-03-06 PROCEDURE — 99213 OFFICE O/P EST LOW 20 MIN: CPT | Performed by: NURSE PRACTITIONER

## 2021-03-06 PROCEDURE — 3008F BODY MASS INDEX DOCD: CPT | Performed by: NURSE PRACTITIONER

## 2021-03-06 PROCEDURE — 3075F SYST BP GE 130 - 139MM HG: CPT | Performed by: NURSE PRACTITIONER

## 2021-03-06 PROCEDURE — 3078F DIAST BP <80 MM HG: CPT | Performed by: NURSE PRACTITIONER

## 2021-03-06 NOTE — PROGRESS NOTES
Chief Complaint:   Patient presents with:  Breast Problem: lump in left breast      HPI:   This is a 37year old female coming in for a lump in left breast at 11 o'clock position. Lump is painless, has been present about 1 month, not changing in size.  No r on exertion or at rest.  RESPIRATORY:  Denies shortness of breath, wheezing, cough or sputum. BREASTS: See HPI. GASTROINTESTINAL:  Denies abdominal pain, nausea, vomiting, constipation, diarrhea.   NEUROLOGICAL:  Hx intermittent headaches and dizziness at for this Visit:  Requested Prescriptions      No prescriptions requested or ordered in this encounter       Health Maintenance:  Asthma Action Plan due on 01/03/1981  Asthma Control Test due on 01/03/1981  Pneumococcal Vaccine: Birth to 64yrs(1 of 1 - PPSV

## 2021-03-08 ENCOUNTER — HOSPITAL ENCOUNTER (OUTPATIENT)
Dept: MAMMOGRAPHY | Facility: HOSPITAL | Age: 41
Discharge: HOME | End: 2021-03-08
Attending: NURSE PRACTITIONER
Payer: COMMERCIAL

## 2021-03-08 ENCOUNTER — TELEPHONE (OUTPATIENT)
Dept: FAMILY MEDICINE CLINIC | Facility: CLINIC | Age: 41
End: 2021-03-08

## 2021-03-08 DIAGNOSIS — Z12.31 SCREENING MAMMOGRAM, ENCOUNTER FOR: ICD-10-CM

## 2021-03-08 DIAGNOSIS — N63.22 BREAST LUMP ON LEFT SIDE AT 11 O'CLOCK POSITION: ICD-10-CM

## 2021-03-08 PROCEDURE — 77066 DX MAMMO INCL CAD BI: CPT | Performed by: NURSE PRACTITIONER

## 2021-03-08 PROCEDURE — 76642 ULTRASOUND BREAST LIMITED: CPT | Performed by: NURSE PRACTITIONER

## 2021-03-08 PROCEDURE — 77062 BREAST TOMOSYNTHESIS BI: CPT | Performed by: NURSE PRACTITIONER

## 2021-03-08 NOTE — TELEPHONE ENCOUNTER
Received call from pt, said she had mammo done. Needs a biopsy. Notified pt that order has been placed. Provided number to central scheduling, she is agreeable to call and set up appt.

## 2021-03-08 NOTE — IMAGING NOTE
This Breast Care RN assisted Dr. Grace Vargas with recommendation for a left breast 1 site ultrasound guided biopsy for nodule. Procedure reviewed and all questions answered. Emotional and educational support given.    On the day of the biopsy, pt instructed to

## 2021-03-08 NOTE — TELEPHONE ENCOUNTER
Patient calling, asking for a medication to calm her anxiety while having the breast biopsy scheduled for 3/9/21 at 2:30pm. Pt confirms she will have someone driving her to and from the procedure.  She also would like to know if she will need to pump and du

## 2021-03-08 NOTE — TELEPHONE ENCOUNTER
pt had a mammogram and ultrsound in [facility] (they will send results), pt needs order for biopsy. This is Dr Capo Mckeon pt , but seeing by Mikayla Miles last time .

## 2021-03-08 NOTE — TELEPHONE ENCOUNTER
Rx for alprazolam 0.5mg PO x 1 dose sent to her pharmacy on file. Please advise patient that she should not drive after taking this medicine and should not take it with ETOH/other sedating medications.  She should take this about 20-30 minutes before shana

## 2021-03-08 NOTE — TELEPHONE ENCOUNTER
Spoke with pt, reviewed recommendations and instructions from [facility]. Pt verbalized understanding and agreeable with plan. No further questions at this time.

## 2021-03-08 NOTE — TELEPHONE ENCOUNTER
SENT HIGH PRIORITY: Dr. Brett Perez from [facility] calling, states pt had a dx mammo and limited US of L breast this morning; please see results. She is recommending an US guided biopsy of the L breast lump. Please add order for the biopsy.    Note: pt

## 2021-03-08 NOTE — TELEPHONE ENCOUNTER
Noted, results reviewed. Patient notified via phone, she is scheduled for US-guided biopsy left breast tomorrow.

## 2021-03-09 ENCOUNTER — HOSPITAL ENCOUNTER (OUTPATIENT)
Dept: MAMMOGRAPHY | Facility: HOSPITAL | Age: 41
Discharge: HOME | End: 2021-03-09
Attending: NURSE PRACTITIONER
Payer: COMMERCIAL

## 2021-03-09 DIAGNOSIS — N63.0 BREAST NODULE: ICD-10-CM

## 2021-03-09 DIAGNOSIS — R92.8 ABNORMAL MAMMOGRAM OF LEFT BREAST: ICD-10-CM

## 2021-03-09 PROCEDURE — 77065 DX MAMMO INCL CAD UNI: CPT | Performed by: NURSE PRACTITIONER

## 2021-03-09 PROCEDURE — 19083 BX BREAST 1ST LESION US IMAG: CPT | Performed by: NURSE PRACTITIONER

## 2021-03-09 PROCEDURE — 88342 IMHCHEM/IMCYTCHM 1ST ANTB: CPT | Performed by: NURSE PRACTITIONER

## 2021-03-09 PROCEDURE — 88305 TISSUE EXAM BY PATHOLOGIST: CPT | Performed by: NURSE PRACTITIONER

## 2021-03-11 NOTE — IMAGING NOTE
1630: Spoke with Jocy Tovar post Ultrasound Guided Left Breast biopsy. Reinforced post biopsy care and instruction. Ms. Jose Blanco denies any issues with biopsy site- bleeding, drainage, redness, tenderness.      Pathology results and recommendations murtaza

## 2021-03-31 ENCOUNTER — MED REC SCAN ONLY (OUTPATIENT)
Dept: INTEGRATIVE MEDICINE | Facility: CLINIC | Age: 41
End: 2021-03-31

## 2021-05-25 RX ORDER — LEVOTHYROXINE SODIUM 0.05 MG/1
50 TABLET ORAL
Qty: 90 TABLET | Refills: 1 | Status: SHIPPED | OUTPATIENT
Start: 2021-05-25 | End: 2021-11-30

## 2021-05-25 NOTE — TELEPHONE ENCOUNTER
Received call from pt, discussed she needs to schedule an appt in order for refill to be sent. We do not need to see her prior to refilling. Pt verbalized understanding and agreeable. appt scheduled. Med refilled.

## 2021-07-09 ENCOUNTER — OFFICE VISIT (OUTPATIENT)
Dept: UROLOGY | Facility: HOSPITAL | Age: 41
End: 2021-07-09
Attending: OBSTETRICS & GYNECOLOGY
Payer: COMMERCIAL

## 2021-07-09 VITALS
BODY MASS INDEX: 31.92 KG/M2 | SYSTOLIC BLOOD PRESSURE: 110 MMHG | RESPIRATION RATE: 20 BRPM | HEIGHT: 64 IN | DIASTOLIC BLOOD PRESSURE: 62 MMHG | WEIGHT: 187 LBS | TEMPERATURE: 99 F

## 2021-07-09 DIAGNOSIS — N81.84 PELVIC MUSCLE WASTING: ICD-10-CM

## 2021-07-09 DIAGNOSIS — N81.11 CYSTOCELE, MIDLINE: Primary | ICD-10-CM

## 2021-07-09 DIAGNOSIS — N39.41 URGE INCONTINENCE: ICD-10-CM

## 2021-07-09 DIAGNOSIS — N39.3 FEMALE STRESS INCONTINENCE: ICD-10-CM

## 2021-07-09 LAB
BLOOD URINE: NEGATIVE
CONTROL RUN WITHIN 24 HOURS?: YES
LEUKOCYTE ESTERASE URINE: NEGATIVE
NITRITE URINE: NEGATIVE

## 2021-07-09 PROCEDURE — 81002 URINALYSIS NONAUTO W/O SCOPE: CPT | Performed by: OBSTETRICS & GYNECOLOGY

## 2021-07-09 PROCEDURE — 87086 URINE CULTURE/COLONY COUNT: CPT | Performed by: OBSTETRICS & GYNECOLOGY

## 2021-07-09 PROCEDURE — 99212 OFFICE O/P EST SF 10 MIN: CPT

## 2021-07-09 NOTE — PROGRESS NOTES
Florentino Alvarenga,   7/9/2021     Referred by self  Pt here with self    Patient presents with:  Prolapse      HPI:  +MIRZA  +UUI  + prolapse sx - worse since last delivery  Some dyspareunia  Reg bowels    PRIOR TREATMENTS:    · Kegels  · Physical Therapy  · (light liner)  Wears Pad Night?: 1 (light liner)  Activities are limited by UI/POP?: Yes  Currently Sexually Active: Yes  Avoids sexual activity due to: Pain    Review of Systems:    A comprehensive 12 point review of systems was completed.   Pertinent posi dysfunction, long-term catheterization and re-operation. Post-op restrictions and expectations reviewed. Discussed dietary and behavioral modification, discussed pharmacologic and nonpharmacologic mgmt options for urinary symptoms.  Discussed dietary & we

## 2021-09-23 ENCOUNTER — OFFICE VISIT (OUTPATIENT)
Dept: INTEGRATIVE MEDICINE | Facility: CLINIC | Age: 41
End: 2021-09-23
Payer: COMMERCIAL

## 2021-09-23 VITALS
SYSTOLIC BLOOD PRESSURE: 96 MMHG | HEART RATE: 62 BPM | WEIGHT: 163.19 LBS | OXYGEN SATURATION: 99 % | DIASTOLIC BLOOD PRESSURE: 70 MMHG | HEIGHT: 64 IN | BODY MASS INDEX: 27.86 KG/M2

## 2021-09-23 DIAGNOSIS — D35.2 PITUITARY ADENOMA (HCC): ICD-10-CM

## 2021-09-23 DIAGNOSIS — E03.9 HYPOTHYROIDISM, UNSPECIFIED TYPE: ICD-10-CM

## 2021-09-23 DIAGNOSIS — N81.10 FEMALE CYSTOCELE: ICD-10-CM

## 2021-09-23 DIAGNOSIS — R41.3 MEMORY LOSS: ICD-10-CM

## 2021-09-23 DIAGNOSIS — R41.0 CONFUSION: ICD-10-CM

## 2021-09-23 DIAGNOSIS — J45.21 MILD INTERMITTENT ASTHMA WITH ACUTE EXACERBATION: ICD-10-CM

## 2021-09-23 DIAGNOSIS — Z00.00 ROUTINE MEDICAL EXAM: Primary | ICD-10-CM

## 2021-09-23 DIAGNOSIS — N39.3 STRESS INCONTINENCE: ICD-10-CM

## 2021-09-23 DIAGNOSIS — E66.3 OVERWEIGHT (BMI 25.0-29.9): ICD-10-CM

## 2021-09-23 PROBLEM — O09.529 ANTEPARTUM MULTIGRAVIDA OF ADVANCED MATERNAL AGE: Status: RESOLVED | Noted: 2019-12-18 | Resolved: 2021-09-23

## 2021-09-23 PROBLEM — O09.529 ANTEPARTUM MULTIGRAVIDA OF ADVANCED MATERNAL AGE (HCC): Status: RESOLVED | Noted: 2019-12-18 | Resolved: 2021-09-23

## 2021-09-23 PROBLEM — E66.9 OBESITY (BMI 30-39.9): Status: RESOLVED | Noted: 2019-11-21 | Resolved: 2021-09-23

## 2021-09-23 PROCEDURE — 99396 PREV VISIT EST AGE 40-64: CPT | Performed by: FAMILY MEDICINE

## 2021-09-23 PROCEDURE — 3008F BODY MASS INDEX DOCD: CPT | Performed by: FAMILY MEDICINE

## 2021-09-23 PROCEDURE — 3074F SYST BP LT 130 MM HG: CPT | Performed by: FAMILY MEDICINE

## 2021-09-23 PROCEDURE — 3078F DIAST BP <80 MM HG: CPT | Performed by: FAMILY MEDICINE

## 2021-09-23 NOTE — PROGRESS NOTES
Zak Norman is a 37year old female. Patient presents with:  Physical      HPI:     Still with the head pressure, confusion and memory loss. Vision changes at night. Seeing a counselor. Dropped 24 lbs this summer.    Eating more fruits and veggie file for this patient.     MEDICAL HISTORY:     Past Medical History:   Diagnosis Date   • Hypothyroidism        CURRENT MEDICATIONS:     Current Outpatient Medications   Medication Sig Dispense Refill   • VENTOLIN  (90 Base) MCG/ACT Inhalation Aero on file      Attends Confucianist Services: Not on file      Active Member of Clubs or Organizations: Not on file      Attends Club or Organization Meetings: Not on file      Marital Status: Not on file  Intimate Partner Violence:       Fear of Current or Ex- Findings: No erythema or rash. Neurological:      Mental Status: She is alert and oriented to person, place, and time. Cranial Nerves: No cranial nerve deficit. Gait: Gait is intact.    Psychiatric:         Mood and Affect: Mood and affect zac transform. The products and items listed below (the “Products”)  and their claims have not been evaluated by the Food and Drug Administration. Dietary products are not intended to treat, prevent, mitigate or cure disease.  Ultimately, you must draw your

## 2021-09-23 NOTE — PATIENT INSTRUCTIONS
I have complete sameera in the body's ability to heal and transform. The products and items listed below (the “Products”)  and their claims have not been evaluated by the Food and Drug Administration.  Dietary products are not intended to treat, prevent, m

## 2021-10-12 ENCOUNTER — TELEPHONE (OUTPATIENT)
Dept: INTEGRATIVE MEDICINE | Facility: CLINIC | Age: 41
End: 2021-10-12

## 2021-10-12 NOTE — TELEPHONE ENCOUNTER
Order for Pelvic Floor Therapy   [facility], Kelly Wu     #[phone]  [facility]#[phone]    Thank you!

## 2021-10-19 ENCOUNTER — TELEPHONE (OUTPATIENT)
Dept: INTEGRATIVE MEDICINE | Facility: CLINIC | Age: 41
End: 2021-10-19

## 2021-10-24 DIAGNOSIS — J45.21 MILD INTERMITTENT ASTHMA WITH ACUTE EXACERBATION: ICD-10-CM

## 2021-10-25 RX ORDER — ALBUTEROL SULFATE 90 UG/1
AEROSOL, METERED RESPIRATORY (INHALATION)
Qty: 8.5 G | Refills: 0 | Status: SHIPPED | OUTPATIENT
Start: 2021-10-25 | End: 2022-02-02

## 2021-10-25 NOTE — TELEPHONE ENCOUNTER
A refill request was received for:  Requested Prescriptions     Pending Prescriptions Disp Refills   • ALBUTEROL 108 (90 Base) MCG/ACT Inhalation Aero Soln [Pharmacy Med Name: ALBUTEROL HFA INH (200 PUFFS)8.5GM] 8.5 g 0     Sig: INHALE 2 PUFFS INTO THE FANTA

## 2021-11-30 RX ORDER — LEVOTHYROXINE SODIUM 0.05 MG/1
TABLET ORAL
Qty: 90 TABLET | Refills: 0 | Status: SHIPPED | OUTPATIENT
Start: 2021-11-30 | End: 2022-10-31

## 2021-12-10 ENCOUNTER — TELEPHONE (OUTPATIENT)
Dept: INTEGRATIVE MEDICINE | Facility: CLINIC | Age: 41
End: 2021-12-10

## 2021-12-10 NOTE — TELEPHONE ENCOUNTER
Pt returned call - need to confirm site for MRI. Insurance does not approve test to be done with [facility]. Pt would like test to be done at [facility]. Advised I would ask managed care to see if this is an option or not.  We will reach out once we h

## 2021-12-14 ENCOUNTER — TELEPHONE (OUTPATIENT)
Dept: INTEGRATIVE MEDICINE | Facility: CLINIC | Age: 41
End: 2021-12-14

## 2021-12-14 DIAGNOSIS — D35.2 PITUITARY MICROADENOMA (HCC): Primary | ICD-10-CM

## 2021-12-14 NOTE — TELEPHONE ENCOUNTER
I spoke with [facility], said previous MRI was done with and without contrast (17442) as opposed to without ([id number]) which is currently ordered. If we change order, they can attain auth. Advised I would clarify with provider.

## 2021-12-14 NOTE — TELEPHONE ENCOUNTER
[facility] called to say that the radiologist would like the CPT code to be: 78659, also would like for someone to call back to discuss order as well.     # [phone]

## 2021-12-15 NOTE — TELEPHONE ENCOUNTER
Spoke with [facility]. They requested that we withdraw our case. Once this is confirmed they will go about obtaining prior authorization for MRI CPT code 04745. Once case is withdrawn please let [facility] know.

## 2021-12-17 NOTE — TELEPHONE ENCOUNTER
Per Brett Grubbs in managed care, Shi Moran already on file for CPT 16374 with [facility]. I called [facility]. Confirmed approval valid 12/15-1/29. Pt scheduled for test on 12/23. Left vm with pt, auth attained by [facility].  To let me know if any issue

## 2021-12-23 ENCOUNTER — HOSPITAL ENCOUNTER (OUTPATIENT)
Dept: ULTRASOUND IMAGING | Facility: HOSPITAL | Age: 41
Discharge: HOME | End: 2021-12-23
Attending: NURSE PRACTITIONER
Payer: COMMERCIAL

## 2021-12-23 DIAGNOSIS — N63.22 BREAST LUMP ON LEFT SIDE AT 11 O'CLOCK POSITION: ICD-10-CM

## 2021-12-23 PROCEDURE — 76642 ULTRASOUND BREAST LIMITED: CPT | Performed by: NURSE PRACTITIONER

## 2021-12-30 ENCOUNTER — OFFICE VISIT (OUTPATIENT)
Dept: NEUROLOGY | Facility: CLINIC | Age: 41
End: 2021-12-30
Payer: COMMERCIAL

## 2021-12-30 VITALS
DIASTOLIC BLOOD PRESSURE: 74 MMHG | HEIGHT: 64 IN | WEIGHT: 163 LBS | RESPIRATION RATE: 16 BRPM | BODY MASS INDEX: 27.83 KG/M2 | HEART RATE: 70 BPM | SYSTOLIC BLOOD PRESSURE: 110 MMHG

## 2021-12-30 DIAGNOSIS — R68.89 SPELLS OF DECREASED ATTENTIVENESS: Primary | ICD-10-CM

## 2021-12-30 DIAGNOSIS — D35.2 PITUITARY ADENOMA (HCC): ICD-10-CM

## 2021-12-30 DIAGNOSIS — R47.89 WORD FINDING DIFFICULTY: ICD-10-CM

## 2021-12-30 DIAGNOSIS — R41.3 MEMORY LOSS: ICD-10-CM

## 2021-12-30 PROCEDURE — 3008F BODY MASS INDEX DOCD: CPT | Performed by: OTHER

## 2021-12-30 PROCEDURE — 99204 OFFICE O/P NEW MOD 45 MIN: CPT | Performed by: OTHER

## 2021-12-30 PROCEDURE — 3074F SYST BP LT 130 MM HG: CPT | Performed by: OTHER

## 2021-12-30 PROCEDURE — 3078F DIAST BP <80 MM HG: CPT | Performed by: OTHER

## 2021-12-30 RX ORDER — RIBOFLAVIN (VITAMIN B2) 100 MG
100 TABLET ORAL DAILY
COMMUNITY

## 2021-12-30 RX ORDER — BIOTIN 10 MG
TABLET ORAL
COMMUNITY

## 2021-12-30 NOTE — H&P
[facility] New Patient / Consult Visit    Jero Jacob is a 37year old female. Referring MD: No ref.  provider found    Patient presents with:  Neurologic Problem: Referred by PCP, memory/confusion concerns, M Drug use: Never    Family History   Problem Relation Age of Onset   • Cancer Mother    • Pancreatic Cancer Mother    • Breast Cancer Other         maternal great aunt   • Diabetes Neg    • Glaucoma Neg    • Macular degeneration Neg        Allergies:    Egg and situation  Speech: fluent  Language: normal naming, repetition, and comprehension  Memory: normal  Attention/concentration: normal    MOCA: 22/30  Visuospatial/executive: 4/5 (could not show 10 past 11 on clock - pointed to 10 and 11)  Naming: 3/3  Att diminished postcontrast enhancement with respect to the remainder of the gland. The pituitary infundibulum remains deviated towards the left. No suprasellar or parasellar mass is identified and the floor the sella turcica is   unremarkable.      Limited who are no obvious cranial neuropathies on examination.   Cognitive evaluation, however, demonstrates some mild impairment with MOCA score 22/30 (visuospatial/executive 4/5, naming 3/3, attention 5/6, language 2/3, abstraction 2/2, delayed recall 0/5, orientati VITAMIN B12        As noted above     (D35.2) Pituitary adenoma ([facility] [address] [address]  Plan: as noted above     (R41.3) Memory loss  Plan: EEG, VITAMIN B12        As noted above     (R47.89) Word finding difficulty  Plan: EEG        As noted above    No follow-ups on file

## 2022-01-15 ENCOUNTER — OFFICE VISIT (OUTPATIENT)
Dept: ENDOCRINOLOGY CLINIC | Facility: CLINIC | Age: 42
End: 2022-01-15
Payer: COMMERCIAL

## 2022-01-15 VITALS
BODY MASS INDEX: 27 KG/M2 | DIASTOLIC BLOOD PRESSURE: 79 MMHG | WEIGHT: 159 LBS | HEART RATE: 53 BPM | SYSTOLIC BLOOD PRESSURE: 132 MMHG

## 2022-01-15 DIAGNOSIS — D35.2 PITUITARY MICROADENOMA (HCC): Primary | ICD-10-CM

## 2022-01-15 DIAGNOSIS — L65.9 HAIR LOSS: ICD-10-CM

## 2022-01-15 PROCEDURE — 3078F DIAST BP <80 MM HG: CPT | Performed by: INTERNAL MEDICINE

## 2022-01-15 PROCEDURE — 99214 OFFICE O/P EST MOD 30 MIN: CPT | Performed by: INTERNAL MEDICINE

## 2022-01-15 PROCEDURE — 3075F SYST BP GE 130 - 139MM HG: CPT | Performed by: INTERNAL MEDICINE

## 2022-01-15 NOTE — PROGRESS NOTES
Follow-up - Reason for Visit:  Pituitary Microadenoma. Requesting Physician: self-referral.    CHIEF COMPLAINT:  Patient presents with:   Follow - Up: Pt is in to follow up on Pituitary adenoma       HISTORY OF PRESENT ILLNESS:   Charline Perez is a 40 year Sig Dispense Refill   • Magnesium 100 MG Oral Tab Take by mouth. • Zinc Sulfate (ZINC 15 OR) Take by mouth. • Ascorbic Acid (VITAMIN C) 100 MG Oral Tab Take 100 mg by mouth daily. • Biotin 10 MG Oral Tab Take by mouth.      • COLLAGEN OR Take by no adenopathy, thyroid symmetric, not enlarged and no tenderness  HEMATOLOGIC/LYMPHATICS:  no cervical lymphadenopathy and no supraclavicular lymphadenopathy  LUNGS: clear to auscultation bilaterally, no crackles or wheezing  CARDIOVASCULAR:  regular rate 2. Small focus of periventricular white matter signal alteration in the left temporoparietal region appears stable. The finding is nonspecific and can be seen with chronic migraine headache syndrome.  Given the patient's age, microvascular ischemic   rehman

## 2022-01-25 LAB
ABSOLUTE BASOPHILS: 39 CELLS/UL (ref 0–200)
ABSOLUTE EOSINOPHILS: 493 CELLS/UL (ref 15–500)
ABSOLUTE LYMPHOCYTES: 2167 CELLS/UL (ref 850–3900)
ABSOLUTE MONOCYTES: 403 CELLS/UL (ref 200–950)
ABSOLUTE NEUTROPHILS: 2498 CELLS/UL (ref 1500–7800)
ALBUMIN/GLOBULIN RATIO: 1.6 (CALC) (ref 1–2.5)
ALBUMIN: 4.3 G/DL (ref 3.6–5.1)
ALKALINE PHOSPHATASE: 47 U/L (ref 31–125)
ALT: 11 U/L (ref 6–29)
AST: 12 U/L (ref 10–30)
BASOPHILS: 0.7 %
BILIRUBIN, TOTAL: 0.4 MG/DL (ref 0.2–1.2)
BUN: 13 MG/DL (ref 7–25)
CALCIUM: 9.8 MG/DL (ref 8.6–10.2)
CARBON DIOXIDE: 28 MMOL/L (ref 20–32)
CHLORIDE: 106 MMOL/L (ref 98–110)
CREATININE: 0.97 MG/DL (ref 0.5–1.1)
EGFR IF AFRICN AM: 84 ML/MIN/1.73M2
EGFR IF NONAFRICN AM: 73 ML/MIN/1.73M2
EOSINOPHILS: 8.8 %
GLOBULIN: 2.7 G/DL (CALC) (ref 1.9–3.7)
GLUCOSE: 88 MG/DL (ref 65–99)
HEMATOCRIT: 36.6 % (ref 35–45)
HEMOGLOBIN A1C: 4.9 % OF TOTAL HGB
HEMOGLOBIN: 12.2 G/DL (ref 11.7–15.5)
LYMPHOCYTES: 38.7 %
MCH: 30 PG (ref 27–33)
MCHC: 33.3 G/DL (ref 32–36)
MCV: 89.9 FL (ref 80–100)
MONOCYTES: 7.2 %
MPV: 10.1 FL (ref 7.5–12.5)
NEUTROPHILS: 44.6 %
PLATELET COUNT: 335 THOUSAND/UL (ref 140–400)
POTASSIUM: 4.6 MMOL/L (ref 3.5–5.3)
PROTEIN, TOTAL: 7 G/DL (ref 6.1–8.1)
RDW: 12.2 % (ref 11–15)
RED BLOOD CELL COUNT: 4.07 MILLION/UL (ref 3.8–5.1)
SODIUM: 140 MMOL/L (ref 135–146)
T3, FREE: 2.9 PG/ML (ref 2.3–4.2)
T4, FREE: 1.1 NG/DL (ref 0.8–1.8)
THYROGLOBULIN ANTIBODIES: <1 IU/ML
THYROID PEROXIDASE$ANTIBODIES: 1 IU/ML
TSH: 2.2 MIU/L
VITAMIN B12: 415 PG/ML (ref 200–1100)
VITAMIN D, 25-OH, TOTAL: 50 NG/ML (ref 30–100)
WHITE BLOOD CELL COUNT: 5.6 THOUSAND/UL (ref 3.8–10.8)

## 2022-01-27 LAB
17 HYDROXYPROGESTERONE,$/MS/MS: 37 NG/DL
ACTH, PLASMA: 41 PG/ML (ref 6–50)
CORTISOL, TOTAL: 15.6 MCG/DL
DHEA SULFATE: 84 MCG/DL (ref 19–231)
FREE TESTOSTERONE: 0.4 PG/ML (ref 0.1–6.4)
PROLACTIN: 4.8 NG/ML
TESTOSTERONE, TOTAL,$/MS/MS: 5 NG/DL (ref 2–45)

## 2022-01-28 ENCOUNTER — NURSE ONLY (OUTPATIENT)
Dept: ELECTROPHYSIOLOGY | Facility: HOSPITAL | Age: 42
End: 2022-01-28
Attending: Other
Payer: COMMERCIAL

## 2022-01-28 ENCOUNTER — TELEPHONE (OUTPATIENT)
Dept: FAMILY MEDICINE CLINIC | Facility: CLINIC | Age: 42
End: 2022-01-28

## 2022-01-28 DIAGNOSIS — R68.89 SPELLS OF DECREASED ATTENTIVENESS: ICD-10-CM

## 2022-01-28 DIAGNOSIS — R41.3 MEMORY LOSS: ICD-10-CM

## 2022-01-28 DIAGNOSIS — R47.89 WORD FINDING DIFFICULTY: ICD-10-CM

## 2022-01-28 PROCEDURE — 95816 EEG AWAKE AND DROWSY: CPT | Performed by: OTHER

## 2022-02-02 NOTE — PROCEDURES
[facility] [place]  in affiliation with [facility]  [address]  [place], [address] [place] [address]  [phone]  Fax [phone]      Name: Carlie Zaman  1/3/1980  Date of [address]     Ordering Physician: Parul Hagan MD                               Primary Care Physician: Jared Katz DO    Technical Aspects:   1. 10-20 International System   2. Referential and Bipolar and monopolar recording montage   3. Routine recording (awake and drowsy)   4. Portable -C. E.S.   5. Impedance - 10 kilohms or less     Clinical History    Carlie Poag had no medications administered during this visit. RS:OIMHCL OF DECREASED ATTENTIVENESS  HX:A 43YO FEMALE WHO PRESENTS FOR MEMORY ISSUES. PT STATES SHE HAS BEEN FEELING OUT OF IT AND OFF BALACE AND DIZZY. PT HAS BEEN HAVING ISSUE WITH \"EYE CROSSING\" WHEN STARING AT A SCREEN AND HAVING HEAD PRESSURE SINCE 2015. ALSO REPORTS HAVING CONFUSIONAL SPELLS IN PAST  AND INTERMITTENT TREMORS. NO SZS HAVE BEEN REPORTED PER CHART. PMH:PITUITARY MICROADENOMA, ELEVATED LFTS  RX:NONE  PREVIOUS EEG:NO PREVIOUS ON FILE  PT STATE:ALERT OX4  DURING EEG:AWAKE  HV:PERFORMED   IPS:PERFORMED      Interpretation    Background activity: Posterior dominant background rhythm consisted of 9-10 Hz, 40-70 uV alpha rhythm, which was reactive to eye closure and eye opening    Slowing:      Sleep: Sleep was not recorded; brief periods of drowsiness were noted. Photic stimulation was and hyperventilation were performed and produced no significant changes. Epileptiform activity: None    Seizures: none    Events: none    Impression: This EEG is normal.  No epileptiform activity, abnormal slowing or clinical or electrographic seizures were noted on this awake and drowsy recording.  It should be noted, however, a single normal routine EEG would not rule out seizures and clinical correlation is advised.      Jason Ovalle MD, Neurology  [facility]  Pager [phone]

## 2022-02-03 RX ORDER — ALBUTEROL SULFATE 90 UG/1
2 AEROSOL, METERED RESPIRATORY (INHALATION) EVERY 6 HOURS PRN
Qty: 8.5 G | Refills: 0 | Status: SHIPPED | OUTPATIENT
Start: 2022-02-03 | End: 2022-03-07

## 2022-03-07 RX ORDER — ALBUTEROL SULFATE 90 UG/1
AEROSOL, METERED RESPIRATORY (INHALATION)
Qty: 8.5 G | Refills: 1 | Status: SHIPPED | OUTPATIENT
Start: 2022-03-07

## 2022-05-16 ENCOUNTER — OFFICE VISIT (OUTPATIENT)
Dept: INTEGRATIVE MEDICINE | Facility: CLINIC | Age: 42
End: 2022-05-16
Payer: COMMERCIAL

## 2022-05-16 VITALS
DIASTOLIC BLOOD PRESSURE: 62 MMHG | OXYGEN SATURATION: 99 % | BODY MASS INDEX: 26.36 KG/M2 | WEIGHT: 154.38 LBS | SYSTOLIC BLOOD PRESSURE: 116 MMHG | HEART RATE: 60 BPM | HEIGHT: 64 IN

## 2022-05-16 DIAGNOSIS — R41.0 CONFUSION: ICD-10-CM

## 2022-05-16 DIAGNOSIS — R92.8 ABNORMAL MAMMOGRAM OF LEFT BREAST: Primary | ICD-10-CM

## 2022-05-16 DIAGNOSIS — N63.22 MASS OF UPPER INNER QUADRANT OF LEFT BREAST: ICD-10-CM

## 2022-05-16 DIAGNOSIS — R41.3 MEMORY LOSS: ICD-10-CM

## 2022-05-16 DIAGNOSIS — Z80.9 FAMILY HISTORY OF CANCER: ICD-10-CM

## 2022-05-16 DIAGNOSIS — D35.2 PITUITARY MICROADENOMA (HCC): ICD-10-CM

## 2022-05-16 PROBLEM — R41.82 ALTERED MENTAL STATUS: Status: ACTIVE | Noted: 2022-01-27

## 2022-05-16 PROBLEM — G47.50 PARASOMNIA: Status: ACTIVE | Noted: 2022-01-27

## 2022-05-16 PROBLEM — G44.209 TENSION-TYPE HEADACHE: Status: ACTIVE | Noted: 2022-01-27

## 2022-05-27 ENCOUNTER — HOSPITAL ENCOUNTER (OUTPATIENT)
Dept: ULTRASOUND IMAGING | Facility: HOSPITAL | Age: 42
Discharge: HOME | End: 2022-05-27
Attending: FAMILY MEDICINE
Payer: COMMERCIAL

## 2022-05-27 ENCOUNTER — HOSPITAL ENCOUNTER (OUTPATIENT)
Dept: MAMMOGRAPHY | Facility: HOSPITAL | Age: 42
Discharge: HOME | End: 2022-05-27
Attending: FAMILY MEDICINE
Payer: COMMERCIAL

## 2022-05-27 DIAGNOSIS — R92.8 ABNORMAL MAMMOGRAM OF LEFT BREAST: ICD-10-CM

## 2022-05-27 DIAGNOSIS — N63.22 MASS OF UPPER INNER QUADRANT OF LEFT BREAST: ICD-10-CM

## 2022-05-27 PROCEDURE — 76642 ULTRASOUND BREAST LIMITED: CPT | Performed by: FAMILY MEDICINE

## 2022-05-27 PROCEDURE — 77066 DX MAMMO INCL CAD BI: CPT | Performed by: FAMILY MEDICINE

## 2022-05-27 PROCEDURE — 77062 BREAST TOMOSYNTHESIS BI: CPT | Performed by: FAMILY MEDICINE

## 2022-05-31 ENCOUNTER — TELEPHONE (OUTPATIENT)
Dept: INTEGRATIVE MEDICINE | Facility: CLINIC | Age: 42
End: 2022-05-31

## 2022-05-31 DIAGNOSIS — R92.8 ABNORMAL MAMMOGRAM OF RIGHT BREAST: Primary | ICD-10-CM

## 2022-05-31 DIAGNOSIS — N63.11 MASS OF UPPER OUTER QUADRANT OF RIGHT BREAST: ICD-10-CM

## 2022-05-31 NOTE — TELEPHONE ENCOUNTER
Alexy Mckeon  Breast Coordinator, RN   GD#[phone]     Requesting order for Right Breast US Guided Biopsy, requesting if can be entered this week.  Thank you

## 2022-06-03 ENCOUNTER — HOSPITAL ENCOUNTER (OUTPATIENT)
Dept: MAMMOGRAPHY | Facility: HOSPITAL | Age: 42
Discharge: HOME | End: 2022-06-03
Attending: FAMILY MEDICINE
Payer: COMMERCIAL

## 2022-06-03 ENCOUNTER — HOSPITAL ENCOUNTER (OUTPATIENT)
Dept: ULTRASOUND IMAGING | Facility: HOSPITAL | Age: 42
Discharge: HOME | End: 2022-06-03
Attending: FAMILY MEDICINE
Payer: COMMERCIAL

## 2022-06-03 DIAGNOSIS — N63.11 MASS OF UPPER OUTER QUADRANT OF RIGHT BREAST: ICD-10-CM

## 2022-06-03 DIAGNOSIS — R92.8 ABNORMAL MAMMOGRAM OF RIGHT BREAST: ICD-10-CM

## 2022-06-03 DIAGNOSIS — N63.10 BREAST MASS, RIGHT: ICD-10-CM

## 2022-06-03 PROCEDURE — 77065 DX MAMMO INCL CAD UNI: CPT | Performed by: FAMILY MEDICINE

## 2022-06-03 PROCEDURE — 19083 BX BREAST 1ST LESION US IMAG: CPT | Performed by: FAMILY MEDICINE

## 2022-06-03 PROCEDURE — 88305 TISSUE EXAM BY PATHOLOGIST: CPT | Performed by: FAMILY MEDICINE

## 2022-06-03 NOTE — PROCEDURES
[facility]  Procedure Note    Zoe Sessions Patient Status:  Outpatient    1/3/1980 MRN [id number]   Location [address] Attending Aayush Barnard DO   Hosp Day # 0 PCP Michelle Billingsley DO     Procedure: Right breast ultrasound guided biopsy    Pre-Procedure Diagnosis:  Right breast mass    Post-Procedure Diagnosis: Right breast mass    Anesthesia:  Local    Findings:  Right breast mass    Specimens: multiple cores    Blood Loss:  minimal    Tourniquet Time: none  Complications:  None  Drains:  none    Connor Avila MD  6/3/2022

## 2022-06-06 ENCOUNTER — TELEPHONE (OUTPATIENT)
Dept: ULTRASOUND IMAGING | Facility: HOSPITAL | Age: 42
End: 2022-06-06

## 2022-06-14 ENCOUNTER — OFFICE VISIT (OUTPATIENT)
Dept: INTEGRATIVE MEDICINE | Facility: CLINIC | Age: 42
End: 2022-06-14
Payer: COMMERCIAL

## 2022-06-14 VITALS
DIASTOLIC BLOOD PRESSURE: 56 MMHG | WEIGHT: 156 LBS | BODY MASS INDEX: 26.63 KG/M2 | SYSTOLIC BLOOD PRESSURE: 116 MMHG | HEIGHT: 64 IN | OXYGEN SATURATION: 100 % | HEART RATE: 66 BPM

## 2022-06-14 DIAGNOSIS — N63.20 MASS OF LEFT BREAST, UNSPECIFIED QUADRANT: Primary | ICD-10-CM

## 2022-06-14 PROCEDURE — 3008F BODY MASS INDEX DOCD: CPT | Performed by: FAMILY MEDICINE

## 2022-06-14 PROCEDURE — 99213 OFFICE O/P EST LOW 20 MIN: CPT | Performed by: FAMILY MEDICINE

## 2022-06-14 PROCEDURE — 3078F DIAST BP <80 MM HG: CPT | Performed by: FAMILY MEDICINE

## 2022-06-14 PROCEDURE — 3074F SYST BP LT 130 MM HG: CPT | Performed by: FAMILY MEDICINE

## 2022-06-29 DIAGNOSIS — J45.21 MILD INTERMITTENT ASTHMA WITH ACUTE EXACERBATION: ICD-10-CM

## 2022-06-30 RX ORDER — ALBUTEROL SULFATE 90 UG/1
2 AEROSOL, METERED RESPIRATORY (INHALATION) EVERY 6 HOURS PRN
Qty: 8.5 G | Refills: 3 | Status: SHIPPED | OUTPATIENT
Start: 2022-06-30

## 2022-08-12 LAB — AMB EXT HPV: NEGATIVE

## 2022-08-19 ENCOUNTER — NURSE ONLY (OUTPATIENT)
Dept: HEMATOLOGY/ONCOLOGY | Facility: HOSPITAL | Age: 42
End: 2022-08-19
Payer: COMMERCIAL

## 2022-08-19 ENCOUNTER — GENETICS ENCOUNTER (OUTPATIENT)
Dept: GENETICS | Facility: HOSPITAL | Age: 42
End: 2022-08-19
Payer: COMMERCIAL

## 2022-08-19 DIAGNOSIS — Z80.0 FAMILY HISTORY OF PANCREATIC CANCER: Primary | ICD-10-CM

## 2022-08-19 DIAGNOSIS — Z80.9 FAMILY HISTORY OF CANCER: ICD-10-CM

## 2022-08-19 DIAGNOSIS — Z80.3 FAMILY HISTORY OF BREAST CANCER: ICD-10-CM

## 2022-08-19 DIAGNOSIS — Z80.0 FAMILY HISTORY OF ESOPHAGEAL CANCER: ICD-10-CM

## 2022-08-19 PROCEDURE — 96040 HC GENETIC COUNSELING EA 30 MIN: CPT

## 2022-08-19 PROCEDURE — 36415 COLL VENOUS BLD VENIPUNCTURE: CPT

## 2022-08-29 ENCOUNTER — GENETICS ENCOUNTER (OUTPATIENT)
Dept: HEMATOLOGY/ONCOLOGY | Facility: HOSPITAL | Age: 42
End: 2022-08-29

## 2022-08-29 DIAGNOSIS — Z13.71 BRCA GENE MUTATION NEGATIVE IN FEMALE: Primary | ICD-10-CM

## 2022-08-29 NOTE — PROGRESS NOTES
Patient Name: Jolly Sabillon  YOB: 1980    Referring Provider:  Jamee Woods DO     Reason for Referral:  Ms. Shelli Gotti had genetic testing performed on 8/19/2022 because of a family history of cancer. Genetic Testing Result:  Negative for pathogenic variants. One variant of uncertain significance identified. No pathogenic variant was found in the following 54 genes on the [facility] pancreatic cancer panel and common hereditary cancers panel: APC*, MIKE*, AXIN2, BARD1, BMPR1A, BRCA1, BRCA2, BRIP1, CASR, CDH1, CDK4, CDKN2A (p14ARF), CDKN2A ([id number]), CHEK2, CPA1, CTNNA1, CTRC, DICER1*, EPCAM*, FANCC, GREM1*, HOXB13, KIT, MEN1*, MLH1*, MSH2*, MSH3*, MSH6*, MUTYH, NBN, NF1*, NTHL1, PALB2, PALLD, PDGFRA, PMS2*, POLD1*, POLE, PRSS1*, PTEN*, RAD50, RAD51C, RAD51D, SDHA*, SDHB, SDHC*, SDHD, SMAD4, SMARCA4, SPINK1, STK11, TP53, TSC1*, TSC2, VHL. A variant of uncertain significance, POLE c.1138G>A ([id number].[id number]), was identified. Please refer to the report from [facility] for additional testing information. These results were discussed with Ms. Shelli Gotti via telephone on 8/29/2022. Summary and Plan:   These results indicate it is unlikely that Ms. Shelli Gotti has a pathogenic variant (harmful genetic mutation) in any of the genes listed above. No pathogenic variants associated with hereditary cancer syndromes were identified. The etiology Ms. Puga's family history of cancer remains genetically unexplained. Ms. Shelli Gotti was found to have a POLE c.1138G>A ([id number]) variant of uncertain significance. A variant of uncertain significance (VUS) means that a mutation has been identified in a cancer susceptibility gene; however, it is currently uncertain if the mutation is pathogenic (harmful) or benign (harmless). With time, the mutation may be reclassified as either harmful or harmless. No changes in management or testing of family members is recommended based on a VUS result.  The limitations of the testing were discussed with Ms. Med Mendez including the chance that a pathogenic variant in a gene other than those included in this analysis might be the cause of cancer in Ms. Med Mendez or in relatives. Management and surveillance for Ms. Med Mendez and other family members should be based on their personal and family history, with screening for cancers beginning 10 years younger than the earliest age at diagnosis if it would push screening to start at an earlier age than recommended for the general population. All medical management decisions should be made with a physician. I encouraged Ms. Med Mendez to share her genetic test results with her relatives so that they may discuss the implications of this information with their health providers. Ms. Med Mendez is also encouraged to contact me on an annual basis to learn if there have been any updates in genetic testing that would apply, changes in the personal and/or family history, or changes in the classification of the POLE VUS. Please do not hesitate to contact my office if you have any questions or concerns, [phone].      Mary Flores MS, CGC

## 2022-10-12 ENCOUNTER — OFFICE VISIT (OUTPATIENT)
Dept: SURGERY | Facility: CLINIC | Age: 42
End: 2022-10-12
Payer: COMMERCIAL

## 2022-10-12 VITALS
DIASTOLIC BLOOD PRESSURE: 70 MMHG | TEMPERATURE: 98 F | SYSTOLIC BLOOD PRESSURE: 123 MMHG | HEART RATE: 60 BPM | BODY MASS INDEX: 26.94 KG/M2 | WEIGHT: 157.81 LBS | RESPIRATION RATE: 16 BRPM | HEIGHT: 64 IN | OXYGEN SATURATION: 100 %

## 2022-10-12 DIAGNOSIS — N63.20 MASS OF LEFT BREAST, UNSPECIFIED QUADRANT: ICD-10-CM

## 2022-10-12 DIAGNOSIS — D24.1 BREAST FIBROADENOMA IN FEMALE, RIGHT: Primary | ICD-10-CM

## 2022-10-12 PROCEDURE — 99243 OFF/OP CNSLTJ NEW/EST LOW 30: CPT

## 2022-10-12 PROCEDURE — 3008F BODY MASS INDEX DOCD: CPT

## 2022-10-12 PROCEDURE — 3078F DIAST BP <80 MM HG: CPT

## 2022-10-12 PROCEDURE — 3074F SYST BP LT 130 MM HG: CPT

## 2022-10-31 RX ORDER — LEVOTHYROXINE SODIUM 0.05 MG/1
50 TABLET ORAL
Qty: 90 TABLET | Refills: 0 | Status: SHIPPED | OUTPATIENT
Start: 2022-10-31

## 2022-11-17 ENCOUNTER — PATIENT MESSAGE (OUTPATIENT)
Dept: INTEGRATIVE MEDICINE | Facility: CLINIC | Age: 42
End: 2022-11-17

## 2022-12-13 ENCOUNTER — TELEPHONE (OUTPATIENT)
Dept: INTEGRATIVE MEDICINE | Facility: CLINIC | Age: 42
End: 2022-12-13

## 2022-12-13 NOTE — TELEPHONE ENCOUNTER
[facility] [address]#[phone]    They currently have order for MRI Brain, Dx: Pituitary Microadenoma. Please clarify if MRI Brain or MRI Pituitary is needed, has had previous Pituitary scans completed.     Thank you

## 2022-12-20 ENCOUNTER — HOSPITAL ENCOUNTER (OUTPATIENT)
Dept: ULTRASOUND IMAGING | Facility: HOSPITAL | Age: 42
Discharge: HOME | End: 2022-12-20
Payer: COMMERCIAL

## 2022-12-20 DIAGNOSIS — N63.20 MASS OF LEFT BREAST, UNSPECIFIED QUADRANT: ICD-10-CM

## 2022-12-20 DIAGNOSIS — D24.1 BREAST FIBROADENOMA IN FEMALE, RIGHT: ICD-10-CM

## 2022-12-20 PROCEDURE — 76642 ULTRASOUND BREAST LIMITED: CPT

## 2022-12-22 ENCOUNTER — OFFICE VISIT (OUTPATIENT)
Dept: NEUROLOGY | Facility: CLINIC | Age: 42
End: 2022-12-22
Payer: COMMERCIAL

## 2022-12-22 ENCOUNTER — TELEPHONE (OUTPATIENT)
Dept: NEUROLOGY | Facility: CLINIC | Age: 42
End: 2022-12-22

## 2022-12-22 VITALS
WEIGHT: 157 LBS | DIASTOLIC BLOOD PRESSURE: 68 MMHG | BODY MASS INDEX: 26.8 KG/M2 | HEART RATE: 70 BPM | HEIGHT: 64 IN | SYSTOLIC BLOOD PRESSURE: 120 MMHG

## 2022-12-22 DIAGNOSIS — J32.9 CHRONIC SINUSITIS, UNSPECIFIED LOCATION: ICD-10-CM

## 2022-12-22 DIAGNOSIS — R41.3 MEMORY LOSS: Primary | ICD-10-CM

## 2022-12-22 DIAGNOSIS — G43.109 MIGRAINE WITH AURA AND WITHOUT STATUS MIGRAINOSUS, NOT INTRACTABLE: ICD-10-CM

## 2022-12-22 DIAGNOSIS — R29.818 TRANSIENT NEUROLOGICAL SYMPTOMS: ICD-10-CM

## 2022-12-22 RX ORDER — TOPIRAMATE 25 MG/1
TABLET ORAL
Qty: 90 TABLET | Refills: 1 | Status: SHIPPED | OUTPATIENT
Start: 2022-12-22

## 2022-12-22 NOTE — TELEPHONE ENCOUNTER
MRI of IAC/Pituitary 10/9/20  MRI Pituitary w and w/o 12/3/16  MRI of the brain and pituitary 8/1/15  CT of the head w/o 7/17/15  Have all been scanned into the PAC's system for Dr. Germaine Knight to review. All images are scanned under the patients maiden name,   [place] . MRI/IAC/Pituitary dated 12/23/21 and 12/13/22 are viewable in Epic along with the images. Reviewed and electronically signed by:  [address], [facility]

## 2022-12-30 ENCOUNTER — PATIENT MESSAGE (OUTPATIENT)
Dept: NEUROLOGY | Facility: CLINIC | Age: 42
End: 2022-12-30

## 2022-12-30 NOTE — TELEPHONE ENCOUNTER
We will need to address it next time on a follow-up visit. There are too many things to consider for the telephone message.

## 2023-01-04 ENCOUNTER — TELEPHONE (OUTPATIENT)
Dept: NEUROLOGY | Facility: CLINIC | Age: 43
End: 2023-01-04

## 2023-01-04 NOTE — TELEPHONE ENCOUNTER
Lab results received from [facility] and have been placed on Dr. Nikki Watts desk to review. Copy has also been sent for scanning. Reviewed and electronically signed by:  500 [facility], [address] [place] [address], [facility]

## 2023-01-12 ENCOUNTER — PATIENT MESSAGE (OUTPATIENT)
Dept: NEUROLOGY | Facility: CLINIC | Age: 43
End: 2023-01-12

## 2023-01-12 NOTE — TELEPHONE ENCOUNTER
Please review and advise. Thanks. Reviewed and electronically signed by:  500 [facility], [address] [place] [address], [facility]

## 2023-01-12 NOTE — TELEPHONE ENCOUNTER
Called to notify Patient of message from Dr Isaías Dacosta as noted below.  Pt verbalized understanding & appreciative of call

## 2023-01-12 NOTE — TELEPHONE ENCOUNTER
If she is taking zinc supplementation and it was still low, then the question is if she absorbing it properly.   Probably best to see gastroenterologist to talk about that

## 2023-01-12 NOTE — TELEPHONE ENCOUNTER
It appears that zinc level was slightly low. We might need to start supplementation.   Otherwise no abnormalities found

## 2023-01-12 NOTE — TELEPHONE ENCOUNTER
From: Bebe Maldonado  To: Yoselyn Lea MD  Sent: 1/12/2023 8:19 AM CST  Subject: Test Results    Hello, I see the labs came back and was waiting to hear what you thought of them did you receive them?  They were coming from [address] [place] [address]. See attached Thank you

## 2023-03-06 RX ORDER — TOPIRAMATE 25 MG/1
TABLET ORAL
Qty: 120 TABLET | Refills: 0 | OUTPATIENT
Start: 2023-03-06

## 2023-03-15 ENCOUNTER — PATIENT MESSAGE (OUTPATIENT)
Dept: NEUROLOGY | Facility: CLINIC | Age: 43
End: 2023-03-15

## 2023-03-15 ENCOUNTER — TELEPHONE (OUTPATIENT)
Dept: NEUROLOGY | Facility: CLINIC | Age: 43
End: 2023-03-15

## 2023-03-15 ENCOUNTER — OFFICE VISIT (OUTPATIENT)
Dept: NEUROLOGY | Facility: CLINIC | Age: 43
End: 2023-03-15
Payer: COMMERCIAL

## 2023-03-15 VITALS — BODY MASS INDEX: 26.8 KG/M2 | HEIGHT: 64 IN | WEIGHT: 157 LBS

## 2023-03-15 DIAGNOSIS — R41.3 MEMORY LOSS: Primary | ICD-10-CM

## 2023-03-15 DIAGNOSIS — R68.89 SPELLS OF DECREASED ATTENTIVENESS: ICD-10-CM

## 2023-03-15 DIAGNOSIS — R29.818 TRANSIENT NEUROLOGICAL SYMPTOMS: ICD-10-CM

## 2023-03-15 PROCEDURE — 99213 OFFICE O/P EST LOW 20 MIN: CPT | Performed by: OTHER

## 2023-03-15 PROCEDURE — 3008F BODY MASS INDEX DOCD: CPT | Performed by: OTHER

## 2023-03-15 RX ORDER — TOPIRAMATE 25 MG/1
TABLET ORAL
Qty: 120 TABLET | Refills: 11 | Status: SHIPPED | OUTPATIENT
Start: 2023-03-15

## 2023-03-15 NOTE — TELEPHONE ENCOUNTER
From: Yusra Hernández  To: Warren Stewart MD  Sent: 3/15/2023 12:07 PM CDT  Subject: Referral    Hello, I have an appointment and they are asking for a referral. I will see Dr. Domingo Vieira. Thank you.     A referral from your treating physician is required for the following providers: A referral from your treating physician is required for the following providers: Seth Leyva and Charbel cabrera Referrals should be faxed to: [phone]

## 2023-05-05 ENCOUNTER — HOSPITAL ENCOUNTER (OUTPATIENT)
Dept: MAMMOGRAPHY | Facility: HOSPITAL | Age: 43
Discharge: HOME | End: 2023-05-05
Payer: COMMERCIAL

## 2023-05-05 PROCEDURE — 77066 DX MAMMO INCL CAD BI: CPT

## 2023-05-05 PROCEDURE — 77062 BREAST TOMOSYNTHESIS BI: CPT

## 2023-06-26 RX ORDER — LEVOTHYROXINE SODIUM 0.05 MG/1
50 TABLET ORAL
Qty: 30 TABLET | Refills: 1 | Status: SHIPPED | OUTPATIENT
Start: 2023-06-26

## 2023-07-06 NOTE — TELEPHONE ENCOUNTER
LVM requesting patient to call back to schedule f/u appt per Dr. Benny Palacios to discuss MRI results and determine the next best steps. Report given to [facility] transport. No further questions at this time.       Freeman Henderson RN  07/06/23 [id number]

## 2023-07-21 ENCOUNTER — PATIENT MESSAGE (OUTPATIENT)
Dept: NEUROLOGY | Facility: CLINIC | Age: 43
End: 2023-07-21

## 2023-08-17 ENCOUNTER — OFFICE VISIT (OUTPATIENT)
Dept: INTEGRATIVE MEDICINE | Facility: CLINIC | Age: 43
End: 2023-08-17
Payer: COMMERCIAL

## 2023-08-17 VITALS
DIASTOLIC BLOOD PRESSURE: 56 MMHG | BODY MASS INDEX: 26 KG/M2 | SYSTOLIC BLOOD PRESSURE: 100 MMHG | WEIGHT: 149.81 LBS | HEART RATE: 64 BPM | OXYGEN SATURATION: 99 %

## 2023-08-17 DIAGNOSIS — E03.9 HYPOTHYROIDISM, UNSPECIFIED TYPE: ICD-10-CM

## 2023-08-17 DIAGNOSIS — E60 ZINC DEFICIENCY: ICD-10-CM

## 2023-08-17 DIAGNOSIS — Z00.00 WELL ADULT EXAM: Primary | ICD-10-CM

## 2023-08-17 DIAGNOSIS — R41.3 MEMORY LOSS: ICD-10-CM

## 2023-08-17 DIAGNOSIS — Z78.9 VEGETARIAN DIET: ICD-10-CM

## 2023-08-17 DIAGNOSIS — E66.3 OVERWEIGHT (BMI 25.0-29.9): ICD-10-CM

## 2023-08-17 PROBLEM — R68.89 SPELLS OF DECREASED ATTENTIVENESS: Status: ACTIVE | Noted: 2022-09-14

## 2023-08-17 PROCEDURE — 3074F SYST BP LT 130 MM HG: CPT | Performed by: FAMILY MEDICINE

## 2023-08-17 PROCEDURE — 99396 PREV VISIT EST AGE 40-64: CPT | Performed by: FAMILY MEDICINE

## 2023-08-17 PROCEDURE — 3078F DIAST BP <80 MM HG: CPT | Performed by: FAMILY MEDICINE

## 2023-09-17 LAB
% SATURATION: 10 % (CALC) (ref 16–45)
ABSOLUTE BASOPHILS: 53 CELLS/UL (ref 0–200)
ABSOLUTE EOSINOPHILS: 584 CELLS/UL (ref 15–500)
ABSOLUTE LYMPHOCYTES: 2289 CELLS/UL (ref 850–3900)
ABSOLUTE MONOCYTES: 283 CELLS/UL (ref 200–950)
ABSOLUTE NEUTROPHILS: 2690 CELLS/UL (ref 1500–7800)
ALBUMIN/GLOBULIN RATIO: 1.4 (CALC) (ref 1–2.5)
ALBUMIN: 4.3 G/DL (ref 3.6–5.1)
ALKALINE PHOSPHATASE: 47 U/L (ref 31–125)
ALT: 11 U/L (ref 6–29)
AST: 13 U/L (ref 10–30)
BASOPHILS: 0.9 %
BILIRUBIN, TOTAL: 0.3 MG/DL (ref 0.2–1.2)
BUN: 15 MG/DL (ref 7–25)
CALCIUM: 9.7 MG/DL (ref 8.6–10.2)
CARBON DIOXIDE: 26 MMOL/L (ref 20–32)
CHLORIDE: 105 MMOL/L (ref 98–110)
CHOL/HDLC RATIO: 3.5 (CALC)
CHOLESTEROL, TOTAL: 189 MG/DL
CREATININE: 0.73 MG/DL (ref 0.5–0.99)
EGFR: 105 ML/MIN/1.73M2
EOSINOPHILS: 9.9 %
FERRITIN: 24 NG/ML (ref 16–232)
GLOBULIN: 3 G/DL (CALC) (ref 1.9–3.7)
GLUCOSE: 89 MG/DL (ref 65–99)
HDL CHOLESTEROL: 54 MG/DL
HEMATOCRIT: 38.6 % (ref 35–45)
HEMOGLOBIN A1C: 5.1 % OF TOTAL HGB
HEMOGLOBIN: 12.6 G/DL (ref 11.7–15.5)
INSULIN: 9.6 UIU/ML
IRON BINDING CAPACITY: 348 MCG/DL (CALC) (ref 250–450)
IRON, TOTAL: 36 MCG/DL (ref 40–190)
LDL-CHOLESTEROL: 113 MG/DL (CALC)
LYMPHOCYTES: 38.8 %
MCH: 29.4 PG (ref 27–33)
MCHC: 32.6 G/DL (ref 32–36)
MCV: 90 FL (ref 80–100)
MONOCYTES: 4.8 %
MPV: 9.8 FL (ref 7.5–12.5)
NEUTROPHILS: 45.6 %
NON-HDL CHOLESTEROL: 135 MG/DL (CALC)
PLATELET COUNT: 363 THOUSAND/UL (ref 140–400)
POTASSIUM: 4 MMOL/L (ref 3.5–5.3)
PROTEIN, TOTAL: 7.3 G/DL (ref 6.1–8.1)
RDW: 12.7 % (ref 11–15)
RED BLOOD CELL COUNT: 4.29 MILLION/UL (ref 3.8–5.1)
SODIUM: 139 MMOL/L (ref 135–146)
T3 REVERSE, /MS/MS: 11 NG/DL (ref 8–25)
T3, FREE: 3.2 PG/ML (ref 2.3–4.2)
T4, FREE: 1.1 NG/DL (ref 0.8–1.8)
TRIGLYCERIDES: 108 MG/DL
TSH: 2.94 MIU/L
VITAMIN B12: 606 PG/ML (ref 200–1100)
WHITE BLOOD CELL COUNT: 5.9 THOUSAND/UL (ref 3.8–10.8)

## 2023-09-27 NOTE — TELEPHONE ENCOUNTER
Please call to schedule f/u appt for med refill Hydroxyzine Pregnancy And Lactation Text: This medication is not safe during pregnancy and should not be taken. It is also excreted in breast milk and breast feeding isn't recommended.

## 2023-10-12 ENCOUNTER — NURSE TRIAGE (OUTPATIENT)
Dept: INTEGRATIVE MEDICINE | Facility: CLINIC | Age: 43
End: 2023-10-12

## 2023-10-12 ENCOUNTER — OFFICE VISIT (OUTPATIENT)
Dept: FAMILY MEDICINE CLINIC | Facility: CLINIC | Age: 43
End: 2023-10-12
Payer: COMMERCIAL

## 2023-10-12 VITALS
WEIGHT: 154 LBS | DIASTOLIC BLOOD PRESSURE: 84 MMHG | TEMPERATURE: 98 F | HEART RATE: 65 BPM | RESPIRATION RATE: 17 BRPM | HEIGHT: 64 IN | BODY MASS INDEX: 26.29 KG/M2 | OXYGEN SATURATION: 99 % | SYSTOLIC BLOOD PRESSURE: 132 MMHG

## 2023-10-12 DIAGNOSIS — I49.3 PVC (PREMATURE VENTRICULAR CONTRACTION): ICD-10-CM

## 2023-10-12 DIAGNOSIS — R00.2 PALPITATIONS: Primary | ICD-10-CM

## 2023-10-12 LAB
ATRIAL RATE: 63 BPM
P AXIS: 61 DEGREES
P-R INTERVAL: 172 MS
Q-T INTERVAL: 406 MS
QRS DURATION: 82 MS
QTC CALCULATION (BEZET): 415 MS
R AXIS: 58 DEGREES
T AXIS: 53 DEGREES
VENTRICULAR RATE: 63 BPM

## 2023-10-12 PROCEDURE — 99213 OFFICE O/P EST LOW 20 MIN: CPT | Performed by: STUDENT IN AN ORGANIZED HEALTH CARE EDUCATION/TRAINING PROGRAM

## 2023-10-12 PROCEDURE — 3075F SYST BP GE 130 - 139MM HG: CPT | Performed by: STUDENT IN AN ORGANIZED HEALTH CARE EDUCATION/TRAINING PROGRAM

## 2023-10-12 PROCEDURE — 93000 ELECTROCARDIOGRAM COMPLETE: CPT | Performed by: STUDENT IN AN ORGANIZED HEALTH CARE EDUCATION/TRAINING PROGRAM

## 2023-10-12 PROCEDURE — 3008F BODY MASS INDEX DOCD: CPT | Performed by: STUDENT IN AN ORGANIZED HEALTH CARE EDUCATION/TRAINING PROGRAM

## 2023-10-12 PROCEDURE — 3079F DIAST BP 80-89 MM HG: CPT | Performed by: STUDENT IN AN ORGANIZED HEALTH CARE EDUCATION/TRAINING PROGRAM

## 2023-10-12 NOTE — TELEPHONE ENCOUNTER
Reason for Disposition   Skipped or extra beat(s) and occurs 4 or more times per minute    Answer Assessment - Initial Assessment Questions  1. DESCRIPTION: \"Please describe your heart rate or heartbeat that you are having\" (e.g., fast/slow, regular/irregular, skipped or extra beats, \"palpitations\")      Irregular- noticeable, heart skipping out of chest  2. ONSET: \"When did it start? \" (Minutes, hours or days)       Going on for a few months, more recently noticed it during- last happened today. 3. DURATION: \"How long does it last\" (e.g., seconds, minutes, hours)      30 minutes  4. PATTERN \"Does it come and go, or has it been constant since it started? \"  \"Does it get worse with exertion? \"   \"Are you feeling it now?\"        5. TAP: \"Using your hand, can you tap out what you are feeling on a chair or table in front of you, so that I can hear? \" (Note: not all patients can do this)          6. HEART RATE: \"Can you tell me your heart rate? \" \"How many beats in 15 seconds? \"  (Note: not all patients can do this)          7. RECURRENT SYMPTOM: \"Have you ever had this before? \" If Yes, ask: \"When was the last time? \" and \"What happened that time?\"         8. CAUSE: \"What do you think is causing the palpitations? \"        9. CARDIAC HISTORY: \"Do you have any history of heart disease? \" (e.g., heart attack, angina, bypass surgery, angioplasty, arrhythmia)       None   10. OTHER SYMPTOMS: \"Do you have any other symptoms? \" (e.g., dizziness, chest pain, sweating, difficulty breathing)        None of the above   11. PREGNANCY: \"Is there any chance you are pregnant? \" \"When was your last menstrual period? \"       None    Protocols used: Heart Rate and Heartbeat [id number]-K-TO      Patient scheduled to see Dr. John Cortez today for assessment and evaluation of symptoms.

## 2023-10-12 NOTE — TELEPHONE ENCOUNTER
Patient c/o increase heart palpitations. Tends to be the evening but is noticing it more during the day.     Please advise, Thank you

## 2023-10-14 ENCOUNTER — OFFICE VISIT (OUTPATIENT)
Dept: ENDOCRINOLOGY CLINIC | Facility: CLINIC | Age: 43
End: 2023-10-14

## 2023-10-14 VITALS
WEIGHT: 156 LBS | DIASTOLIC BLOOD PRESSURE: 74 MMHG | HEART RATE: 56 BPM | SYSTOLIC BLOOD PRESSURE: 108 MMHG | BODY MASS INDEX: 27 KG/M2

## 2023-10-14 DIAGNOSIS — D35.2 PITUITARY ADENOMA (HCC): Primary | ICD-10-CM

## 2023-10-14 DIAGNOSIS — N92.6 IRREGULAR MENSTRUAL CYCLE: ICD-10-CM

## 2023-10-14 PROCEDURE — 3074F SYST BP LT 130 MM HG: CPT | Performed by: INTERNAL MEDICINE

## 2023-10-14 PROCEDURE — 3078F DIAST BP <80 MM HG: CPT | Performed by: INTERNAL MEDICINE

## 2023-10-14 PROCEDURE — 99214 OFFICE O/P EST MOD 30 MIN: CPT | Performed by: INTERNAL MEDICINE

## 2023-10-15 PROBLEM — N92.6 IRREGULAR MENSTRUAL CYCLE: Status: ACTIVE | Noted: 2023-10-15

## 2023-10-20 ENCOUNTER — PATIENT MESSAGE (OUTPATIENT)
Dept: INTEGRATIVE MEDICINE | Facility: CLINIC | Age: 43
End: 2023-10-20

## 2023-10-20 ENCOUNTER — NURSE TRIAGE (OUTPATIENT)
Dept: FAMILY MEDICINE CLINIC | Facility: CLINIC | Age: 43
End: 2023-10-20

## 2023-10-20 NOTE — TELEPHONE ENCOUNTER
S/w Marcia Pena who stated had increased frequency of palpations but now it has been constant x 1 hour. Pt denied chest pain, SOB or dizziness. Pt has appt for lab draw on 10/24/23 and scheduled cardiology appt for 10/30/23. Consult with Dr. Reynaldo Quintero who advised the pt needs evaluation while having palpations to go to ED but if declined go to urgent care. Pt declined ED disposition despite being informed these are symptoms are urgent. Pt agreed to be taken to  now. Reason for Disposition   Skipped or extra beat(s) and occurs 4 or more times per minute    Answer Assessment - Initial Assessment Questions  1. DESCRIPTION: \"Please describe your heart rate or heartbeat that you are having\" (e.g., fast/slow, regular/irregular, skipped or extra beats, \"palpitations\")      Notable extra beat frequently all day. 2. ONSET: \"When did it start? \" (Minutes, hours or days)       Increased frequency 10/12/23; but started intermittent 2 months   3. DURATION: \"How long does it last\" (e.g., seconds, minutes, hours)      Constant 1 hour  4. PATTERN \"Does it come and go, or has it been constant since it started? \"  \"Does it get worse with exertion? \"   \"Are you feeling it now? \"      Intermittent  5. TAP: \"Using your hand, can you tap out what you are feeling on a chair or table in front of you, so that I can hear? \" (Note: not all patients can do this)        Unable  6. HEART RATE: \"Can you tell me your heart rate? \" \"How many beats in 15 seconds? \"  (Note: not all patients can do this)        85 heart rate with 98% pulse oximetry  7. RECURRENT SYMPTOM: \"Have you ever had this before? \" If Yes, ask: \"When was the last time? \" and \"What happened that time? \"       yes  8. CAUSE: \"What do you think is causing the palpitations? \"      Unknown   9. CARDIAC HISTORY: \"Do you have any history of heart disease? \" (e.g., heart attack, angina, bypass surgery, angioplasty, arrhythmia)       Denied  10.  OTHER SYMPTOMS: \"Do you have any other symptoms? \" (e.g., dizziness, chest pain, sweating, difficulty breathing)        Occassional lightheadedness, denied chest pain or SOB  11. PREGNANCY: \"Is there any chance you are pregnant? \" \"When was your last menstrual period? \"        Denied: LMP: 10/7/23    Protocols used: Heart Rate and Heartbeat [id number]-XR

## 2023-10-20 NOTE — TELEPHONE ENCOUNTER
From: Karen Al  To: Edwin Mcgill  Sent: 10/20/2023 1:17 PM CDT  Subject: Heart palpitations     Hello, I have been having heart palpitations and wonder if my supplements may be tje culprit. Let me know what you think  BDNF Essentials   Reacted iron  Molybdenum  Relora  Phytomulti capsules  Zinc  Thank you!

## 2023-10-27 ENCOUNTER — PATIENT MESSAGE (OUTPATIENT)
Dept: FAMILY MEDICINE CLINIC | Facility: CLINIC | Age: 43
End: 2023-10-27

## 2023-10-27 NOTE — TELEPHONE ENCOUNTER
From: Carlie Zaman  To: Little Garcia  Sent: 10/27/2023 12:13 PM CDT  Subject: Holter Hello,   How do I schedule the holter test?  thanks

## 2023-10-29 ENCOUNTER — HOSPITAL ENCOUNTER (EMERGENCY)
Age: 43
Discharge: HOME | End: 2023-10-29
Attending: EMERGENCY MEDICINE

## 2023-10-29 ENCOUNTER — APPOINTMENT (OUTPATIENT)
Dept: CARDIOLOGY | Age: 43
End: 2023-10-29
Attending: EMERGENCY MEDICINE

## 2023-10-29 ENCOUNTER — APPOINTMENT (OUTPATIENT)
Dept: GENERAL RADIOLOGY | Age: 43
End: 2023-10-29
Attending: EMERGENCY MEDICINE

## 2023-10-29 VITALS
BODY MASS INDEX: 26.35 KG/M2 | SYSTOLIC BLOOD PRESSURE: 116 MMHG | RESPIRATION RATE: 16 BRPM | OXYGEN SATURATION: 98 % | HEART RATE: 85 BPM | DIASTOLIC BLOOD PRESSURE: 78 MMHG | TEMPERATURE: 97 F | HEIGHT: 64 IN | WEIGHT: 154.32 LBS

## 2023-10-29 DIAGNOSIS — R00.2 PALPITATIONS: Primary | ICD-10-CM

## 2023-10-29 LAB
ALBUMIN SERPL-MCNC: 3.8 G/DL (ref 3.6–5.1)
ALBUMIN/GLOB SERPL: 1.1 {RATIO} (ref 1–2.4)
ALP SERPL-CCNC: 56 UNITS/L (ref 45–117)
ALT SERPL-CCNC: 18 UNITS/L
ANION GAP SERPL CALC-SCNC: 7 MMOL/L (ref 7–19)
AST SERPL-CCNC: 13 UNITS/L
ATRIAL RATE (BPM): 81
BASOPHILS # BLD: 0 K/MCL (ref 0–0.3)
BASOPHILS NFR BLD: 1 %
BILIRUB SERPL-MCNC: 0.2 MG/DL (ref 0.2–1)
BUN SERPL-MCNC: 10 MG/DL (ref 6–20)
BUN/CREAT SERPL: 14 (ref 7–25)
CALCIUM SERPL-MCNC: 9.3 MG/DL (ref 8.4–10.2)
CHLORIDE SERPL-SCNC: 107 MMOL/L (ref 97–110)
CO2 SERPL-SCNC: 30 MMOL/L (ref 21–32)
CREAT SERPL-MCNC: 0.71 MG/DL (ref 0.51–0.95)
D DIMER PPP FEU-MCNC: 0.4 MG/L (FEU)
DEPRECATED RDW RBC: 42.3 FL (ref 39–50)
EGFRCR SERPLBLD CKD-EPI 2021: >90 ML/MIN/{1.73_M2}
EOSINOPHIL # BLD: 0.8 K/MCL (ref 0–0.5)
EOSINOPHIL NFR BLD: 9 %
ERYTHROCYTE [DISTWIDTH] IN BLOOD: 13 % (ref 11–15)
FASTING DURATION TIME PATIENT: NORMAL H
GLOBULIN SER-MCNC: 3.5 G/DL (ref 2–4)
GLUCOSE SERPL-MCNC: 89 MG/DL (ref 70–99)
HCG UR QL: NEGATIVE
HCT VFR BLD CALC: 35.6 % (ref 36–46.5)
HGB BLD-MCNC: 11.8 G/DL (ref 12–15.5)
IMM GRANULOCYTES # BLD AUTO: 0 K/MCL (ref 0–0.2)
IMM GRANULOCYTES # BLD: 0 %
LYMPHOCYTES # BLD: 2.7 K/MCL (ref 1–4.8)
LYMPHOCYTES NFR BLD: 33 %
MAGNESIUM SERPL-MCNC: 2.1 MG/DL (ref 1.7–2.4)
MCH RBC QN AUTO: 29.6 PG (ref 26–34)
MCHC RBC AUTO-ENTMCNC: 33.1 G/DL (ref 32–36.5)
MCV RBC AUTO: 89.2 FL (ref 78–100)
MONOCYTES # BLD: 0.5 K/MCL (ref 0.3–0.9)
MONOCYTES NFR BLD: 7 %
NEUTROPHILS # BLD: 4.1 K/MCL (ref 1.8–7.7)
NEUTROPHILS NFR BLD: 50 %
NRBC BLD MANUAL-RTO: 0 /100 WBC
NT-PROBNP SERPL-MCNC: 103 PG/ML
P AXIS (DEGREES): 3
PLATELET # BLD AUTO: 278 K/MCL (ref 140–450)
POTASSIUM SERPL-SCNC: 3.8 MMOL/L (ref 3.4–5.1)
PR-INTERVAL (MSEC): 141
PROT SERPL-MCNC: 7.3 G/DL (ref 6.4–8.2)
QRS-INTERVAL (MSEC): 80
QT-INTERVAL (MSEC): 352
QTC: 411
R AXIS (DEGREES): 35
RBC # BLD: 3.99 MIL/MCL (ref 4–5.2)
REPORT TEXT: NORMAL
SODIUM SERPL-SCNC: 140 MMOL/L (ref 135–145)
T AXIS (DEGREES): 37
TROPONIN I SERPL DL<=0.01 NG/ML-MCNC: 11 NG/L
VENTRICULAR RATE EKG/MIN (BPM): 82
WBC # BLD: 8.2 K/MCL (ref 4.2–11)

## 2023-10-29 PROCEDURE — 93010 ELECTROCARDIOGRAM REPORT: CPT | Performed by: INTERNAL MEDICINE

## 2023-10-29 PROCEDURE — 80053 COMPREHEN METABOLIC PANEL: CPT | Performed by: EMERGENCY MEDICINE

## 2023-10-29 PROCEDURE — 85025 COMPLETE CBC W/AUTO DIFF WBC: CPT | Performed by: EMERGENCY MEDICINE

## 2023-10-29 PROCEDURE — 93005 ELECTROCARDIOGRAM TRACING: CPT | Performed by: EMERGENCY MEDICINE

## 2023-10-29 PROCEDURE — 84484 ASSAY OF TROPONIN QUANT: CPT | Performed by: EMERGENCY MEDICINE

## 2023-10-29 PROCEDURE — 83880 ASSAY OF NATRIURETIC PEPTIDE: CPT | Performed by: EMERGENCY MEDICINE

## 2023-10-29 PROCEDURE — 85379 FIBRIN DEGRADATION QUANT: CPT | Performed by: EMERGENCY MEDICINE

## 2023-10-29 PROCEDURE — 36415 COLL VENOUS BLD VENIPUNCTURE: CPT

## 2023-10-29 PROCEDURE — 83735 ASSAY OF MAGNESIUM: CPT | Performed by: EMERGENCY MEDICINE

## 2023-10-29 PROCEDURE — 71046 X-RAY EXAM CHEST 2 VIEWS: CPT

## 2023-10-29 PROCEDURE — 99285 EMERGENCY DEPT VISIT HI MDM: CPT | Performed by: EMERGENCY MEDICINE

## 2023-10-29 PROCEDURE — 84703 CHORIONIC GONADOTROPIN ASSAY: CPT

## 2023-10-29 PROCEDURE — 99285 EMERGENCY DEPT VISIT HI MDM: CPT

## 2023-10-29 ASSESSMENT — PAIN SCALES - GENERAL: PAINLEVEL_OUTOF10: 0

## 2023-10-30 LAB — RAINBOW EXTRA TUBES HOLD SPECIMEN: NORMAL

## 2023-10-31 ENCOUNTER — APPOINTMENT (OUTPATIENT)
Dept: CARDIOLOGY | Age: 43
End: 2023-10-31

## 2023-11-03 LAB
BUN: 12 MG/DL (ref 7–25)
CALCIUM: 9.3 MG/DL (ref 8.6–10.2)
CARBON DIOXIDE: 25 MMOL/L (ref 20–32)
CHLORIDE: 104 MMOL/L (ref 98–110)
CREATININE: 0.71 MG/DL (ref 0.5–0.99)
EGFR: 109 ML/MIN/1.73M2
GLUCOSE: 83 MG/DL (ref 65–139)
HEMATOCRIT: 35.4 % (ref 35–45)
HEMOGLOBIN: 11.6 G/DL (ref 11.7–15.5)
MAGNESIUM: 2.1 MG/DL (ref 1.5–2.5)
MCH: 29.5 PG (ref 27–33)
MCHC: 32.8 G/DL (ref 32–36)
MCV: 90.1 FL (ref 80–100)
MPV: 10.2 FL (ref 7.5–12.5)
PLATELET COUNT: 312 THOUSAND/UL (ref 140–400)
POTASSIUM: 4.2 MMOL/L (ref 3.5–5.3)
RDW: 12.9 % (ref 11–15)
RED BLOOD CELL COUNT: 3.93 MILLION/UL (ref 3.8–5.1)
SODIUM: 137 MMOL/L (ref 135–146)
T4, FREE: 1 NG/DL (ref 0.8–1.8)
TSH: 2.75 MIU/L
WHITE BLOOD CELL COUNT: 5.9 THOUSAND/UL (ref 3.8–10.8)

## 2023-11-07 ENCOUNTER — E-ADVICE (OUTPATIENT)
Dept: CARDIOLOGY | Age: 43
End: 2023-11-07

## 2023-11-07 ENCOUNTER — OFFICE VISIT (OUTPATIENT)
Dept: CARDIOLOGY | Age: 43
End: 2023-11-07

## 2023-11-07 VITALS
TEMPERATURE: 97 F | BODY MASS INDEX: 26.63 KG/M2 | HEART RATE: 70 BPM | SYSTOLIC BLOOD PRESSURE: 114 MMHG | HEIGHT: 64 IN | WEIGHT: 156 LBS | OXYGEN SATURATION: 100 % | DIASTOLIC BLOOD PRESSURE: 66 MMHG

## 2023-11-07 DIAGNOSIS — I49.3 PVC'S (PREMATURE VENTRICULAR CONTRACTIONS): Primary | ICD-10-CM

## 2023-11-07 DIAGNOSIS — Z86.2 HISTORY OF ANEMIA: ICD-10-CM

## 2023-11-07 DIAGNOSIS — R00.2 PALPITATIONS: ICD-10-CM

## 2023-11-07 LAB
ACTH, PLASMA: 28 PG/ML (ref 6–50)
CORTISOL, TOTAL: 15.6 MCG/DL
ESTRADIOL: 34 PG/ML
FSH: 14.2 MIU/ML
IGF I, /MS: 114 NG/ML (ref 52–328)
PROLACTIN: 6.2 NG/ML
T4, FREE: 1 NG/DL (ref 0.8–1.8)
TSH: 2.76 MIU/L
Z SCORE (FEMALE): -0.5 SD

## 2023-11-07 PROCEDURE — 99203 OFFICE O/P NEW LOW 30 MIN: CPT | Performed by: INTERNAL MEDICINE

## 2023-11-07 RX ORDER — PNV NO.95/FERROUS FUM/FOLIC AC 28MG-0.8MG
1 TABLET ORAL DAILY
COMMUNITY

## 2023-11-07 ASSESSMENT — ENCOUNTER SYMPTOMS
SHORTNESS OF BREATH: 0
FEVER: 0
SINUS PAIN: 0
BLOOD IN STOOL: 0
SLEEP DISTURBANCE: 0
CHEST TIGHTNESS: 0
FACIAL SWELLING: 0
LIGHT-HEADEDNESS: 0
SPEECH DIFFICULTY: 0
NUMBNESS: 0
FACIAL ASYMMETRY: 0
HALLUCINATIONS: 0
ABDOMINAL DISTENTION: 0
UNEXPECTED WEIGHT CHANGE: 0
VOMITING: 0
COLOR CHANGE: 0
EYE DISCHARGE: 0
ACTIVITY CHANGE: 0
BACK PAIN: 0
WHEEZING: 0
APNEA: 0
WEAKNESS: 0
HEADACHES: 0
APPETITE CHANGE: 0
CONSTIPATION: 0
DIARRHEA: 0
EYE PAIN: 0
ABDOMINAL PAIN: 0
CONFUSION: 0

## 2023-11-10 ENCOUNTER — ANCILLARY PROCEDURE (OUTPATIENT)
Dept: CARDIOLOGY | Age: 43
End: 2023-11-10
Attending: INTERNAL MEDICINE

## 2023-11-10 DIAGNOSIS — I49.3 PVC'S (PREMATURE VENTRICULAR CONTRACTIONS): ICD-10-CM

## 2023-11-10 LAB
AORTIC VALVE AREA (AVA): 0.94
AORTIC VALVE AREA: 1.46
AV MEAN GRADIENT (AVMG): 5
AV MEAN VELOCITY (AVMV): 1
AV PEAK GRADIENT (AVPG): 9
AV PEAK VELOCITY (AVPV): 1.54
AV STENOSIS SEVERITY TEXT: NORMAL
AVI LVOT PEAK GRADIENT (LVOTMG): 0.7
E WAVE DECELARATION TIME (MDT): 7.9
INTERVENTRICULAR SEPTUM IN END DIASTOLE (IVSD): 2.28
LEFT INTERNAL DIMENSION IN SYSTOLE (LVSD): 0.8
LEFT VENTRICULAR INTERNAL DIMENSION IN DIASTOLE (LVDD): 2.5
LEFT VENTRICULAR POSTERIOR WALL IN END DIASTOLE (LVPW): 3.7
LV EF: NORMAL %
LVOT 2D (LVOTD): 27.8
LVOT VTI (LVOTVTI): 1.32
MV E TISSUE VEL MED (MESV): 16
MV E WAVE VEL/E TISSUE VEL MED(MSR): 11.9
MV PEAK A VELOCITY (MVPAV): 166
MV PEAK E VELOCITY (MVPEV): 0.74
RV END SYSTOLIC LONGITUDINAL STRAIN FREE WALL (RVGS): 1.5
TRICUSPID VALVE PEAK REGURGITATION VELOCITY (TRPV): 2.54
TV ESTIMATED RIGHT ARTERIAL PRESSURE (RAP): 12.5

## 2023-11-10 PROCEDURE — 93306 TTE W/DOPPLER COMPLETE: CPT | Performed by: INTERNAL MEDICINE

## 2023-11-13 ENCOUNTER — E-ADVICE (OUTPATIENT)
Dept: CARDIOLOGY | Age: 43
End: 2023-11-13

## 2023-12-15 ENCOUNTER — OFFICE VISIT (OUTPATIENT)
Dept: SURGERY | Facility: CLINIC | Age: 43
End: 2023-12-15
Payer: COMMERCIAL

## 2023-12-15 VITALS
SYSTOLIC BLOOD PRESSURE: 108 MMHG | HEIGHT: 64 IN | WEIGHT: 155 LBS | DIASTOLIC BLOOD PRESSURE: 56 MMHG | BODY MASS INDEX: 26.46 KG/M2

## 2023-12-15 DIAGNOSIS — N95.1 PERIMENOPAUSAL SYMPTOMS: Primary | ICD-10-CM

## 2023-12-15 PROCEDURE — 3008F BODY MASS INDEX DOCD: CPT | Performed by: OBSTETRICS & GYNECOLOGY

## 2023-12-15 PROCEDURE — 99204 OFFICE O/P NEW MOD 45 MIN: CPT | Performed by: OBSTETRICS & GYNECOLOGY

## 2023-12-15 PROCEDURE — 3078F DIAST BP <80 MM HG: CPT | Performed by: OBSTETRICS & GYNECOLOGY

## 2023-12-15 PROCEDURE — 3074F SYST BP LT 130 MM HG: CPT | Performed by: OBSTETRICS & GYNECOLOGY

## 2023-12-26 ENCOUNTER — APPOINTMENT (OUTPATIENT)
Dept: CARDIOLOGY | Age: 43
End: 2023-12-26

## 2023-12-26 VITALS
BODY MASS INDEX: 27.66 KG/M2 | DIASTOLIC BLOOD PRESSURE: 60 MMHG | SYSTOLIC BLOOD PRESSURE: 122 MMHG | HEART RATE: 77 BPM | OXYGEN SATURATION: 98 % | HEIGHT: 64 IN | WEIGHT: 162 LBS | RESPIRATION RATE: 16 BRPM | TEMPERATURE: 97.1 F

## 2023-12-26 DIAGNOSIS — I49.3 PVC'S (PREMATURE VENTRICULAR CONTRACTIONS): ICD-10-CM

## 2023-12-26 DIAGNOSIS — R00.2 PALPITATIONS: Primary | ICD-10-CM

## 2023-12-26 PROCEDURE — 99213 OFFICE O/P EST LOW 20 MIN: CPT | Performed by: INTERNAL MEDICINE

## 2023-12-26 ASSESSMENT — ENCOUNTER SYMPTOMS
APPETITE CHANGE: 0
BACK PAIN: 0
UNEXPECTED WEIGHT CHANGE: 0
SLEEP DISTURBANCE: 0
HEADACHES: 0
SPEECH DIFFICULTY: 0
COLOR CHANGE: 0
EYE PAIN: 0
FACIAL SWELLING: 0
BLOOD IN STOOL: 0
ACTIVITY CHANGE: 0
NUMBNESS: 0
FEVER: 0
CHEST TIGHTNESS: 0
CONSTIPATION: 0
WEAKNESS: 0
ABDOMINAL PAIN: 0
APNEA: 0
DIARRHEA: 0
FACIAL ASYMMETRY: 0
HALLUCINATIONS: 0
SHORTNESS OF BREATH: 0
EYE DISCHARGE: 0
ABDOMINAL DISTENTION: 0
CONFUSION: 0
SINUS PAIN: 0
VOMITING: 0
LIGHT-HEADEDNESS: 0
WHEEZING: 0

## 2024-02-08 ENCOUNTER — OFFICE VISIT (OUTPATIENT)
Dept: INTEGRATIVE MEDICINE | Facility: CLINIC | Age: 44
End: 2024-02-08
Payer: COMMERCIAL

## 2024-02-08 VITALS
SYSTOLIC BLOOD PRESSURE: 108 MMHG | DIASTOLIC BLOOD PRESSURE: 60 MMHG | OXYGEN SATURATION: 97 % | HEART RATE: 83 BPM | WEIGHT: 160.38 LBS | HEIGHT: 64 IN | BODY MASS INDEX: 27.38 KG/M2

## 2024-02-08 DIAGNOSIS — K59.00 CONSTIPATION, UNSPECIFIED CONSTIPATION TYPE: ICD-10-CM

## 2024-02-08 DIAGNOSIS — R41.3 MEMORY LOSS: ICD-10-CM

## 2024-02-08 DIAGNOSIS — R73.09 ELEVATED GLUCOSE: ICD-10-CM

## 2024-02-08 DIAGNOSIS — R63.5 WEIGHT GAIN: ICD-10-CM

## 2024-02-08 DIAGNOSIS — E03.9 HYPOTHYROIDISM, UNSPECIFIED TYPE: Primary | ICD-10-CM

## 2024-02-08 DIAGNOSIS — E66.3 OVERWEIGHT (BMI 25.0-29.9): ICD-10-CM

## 2024-02-08 DIAGNOSIS — D50.9 IRON DEFICIENCY ANEMIA, UNSPECIFIED IRON DEFICIENCY ANEMIA TYPE: ICD-10-CM

## 2024-02-08 DIAGNOSIS — Z78.9 VEGETARIAN DIET: ICD-10-CM

## 2024-02-08 NOTE — PATIENT INSTRUCTIONS
Fasting labs 2/12/24.     Things we discussed at our appointment.     Discussed how the gut being out of balance can affect all of your hormones. Constipation and bloating makes me concerned there is a leaky gut. We will start a leaky gut protocol. Supplement recommendations at the end.     Below is a hormonal pyramid on how hormones interacts:        Sex hormones - Concern there is a luteal phase defect which means progesterone is not spiking high enough before cycle which can cause mood changes. Blood work is ordered to detect luteal phase.     Thyroid - need to continue to watch thyroid. TSH being above two could mean you body is working hard to supple you with enough thyroid.     Cortisol levels (stress hormone) With ongoing stress you can have abnormalities in your cortisol resulting in memory and energy concerns. We may consider a cortisol saliva spot test in the future.     Insulin- There was a slight increase in fasting glucose and insulin. I am not too concerned about this but could be a contributing factor to your struggles with weight over the years.     Protein with every meal goal 75g a day  Increase fiber daily to help with bowel movements   Net carbs <30g per meal   Total carb - Fiber = Net Carb         One capsule with every meal          1 daily       Take 1 capsule daily, before a meal, or as directed by a health professional.           Serving Size: One Vegetarian Capsule    Amount Per Serving  Deglycyrrhizinated Licorice Extract ... 300mg  (glycyrrhiza glabra)(root)  Aloe Vera Extract ... 50mg  (aloe barbadensis)(inner fillet)  (standardized to contain 10% polysaccharides)  Slippery Elm Extract ... 100mg  (ulmus fulva)(bark)  Marshmallow Root Extract ... 200mg  (althaea officinalis)(root)     The following website can be utilized to purchase supplements.     [email or web address]       FUTURE may consider adding NAC, antimicrobial GI effects, food sensitivity or cortisol  testing     Fruit Carbohydrates per serving Fiber per serving Net carbs   Grapes (1Cup/151g)  26g 0.8g 25.2g   Banana (1 medium) 24g 3.1g 21g   Pear (1 medium) 22g 6g 16g   Apple (1 medium) 21g 4.4g 16.6g         Pineapple (1Cup/165g) 29g 2.3g 26.7   Blueberries (1Cup/148g) 17g 4g 13g   Cantaloupe (1Cup/177g) 14g 1.6g 12.4g   Oranges (1 medium) 12g 2.3g 9.7g   Watermelon (1Cup/154g) 12g 0.6g 11.4g   Peaches (1 Large) 9.5g 3g 6.5   Kiwi (1 medium) 9g 2.1g 6.9   Avocado (half of one) 9.5g 4.6g 4.9g   Strawberries (1Cup/144g) 8g 3 5g   Lime (1 fruit) 7g 1.9g 5.1g   Lemon (1 fruit) 6g 1.6g 4.4g   Tomatoes (1 fruit) 3.2g  1.5g 1.7g   Vegetables Carbohydrates per serving  Fiber per serving  Net carbs per serving    Asparagus (5 krishnan/93.5g) 4g 1.5g 2.5g   Bell Pepper (1 medium) 6g 2g 4g   Broccoli (1 medium stalk) 8g 6g 2g   Carrot (1 carrot 7.5inches long) 7g 1.7g 5.3g   Cauliflower (99g)  5g 2g 3g   Celery (2 medium stalks) 4g 2g 2g   Cucumber (? medium) 2g 1g 1g   Green Beans (3/4C)  5g 3g 2g   Green Cabbage (84g) 5g 2g 3g   Green Onion (1/4C chopped) 2g 1g 1g   Iceberg lettuce (89g)  2g 1.1g 0.9g   Jalapeno (1C sliced 6g 2.5g 3.5g   Leaf lettuce (1/2C shredded) 2g 1g 1.g   Mushrooms (5 medium) 3g 1g 2g   Onion (1 medium) 11g 1.9g 9g   Potatoe (1 medium/148g) 26g 2g 24g   Radishes (7 radishes) 3g 0.7g 2.3g   Summer Squash (½ medium) 4g 1.1g 2.9g   Sweet corn (1 medium ear) 18g 2.4g 15.6   Sweet Potato (1 medium) 23g 4g 19g

## 2024-02-08 NOTE — PROGRESS NOTES
Abigail Puga is a 44 year old female.  Chief Complaint   Patient presents with    Establish Care     Memory issues, brain fog, mood swings for a few  yrs       HPI:   Abigail presents for follow up,     She feels like she has been struggling with word recall, brain fog. Sometime when she has to read things she has to read it twice. She will have to write things down.     She had a neuropsych evaluation. They came back stating that she has depression.    She has had mood changes right before her period she felt depressed after she started her cycle it felt better.      Hormones -   Started her cycle age 13-14. She does not remember having heavy or painful periods.     She was diagnosed with endometriosis early 30s. She was placed on depo shot. She got off the shot and had laparoscopic surgery to remove it.     She has never struggled with getting pregnant, no miscarriages.   Post children bleeding time has gotten shorter. The cycle length is normal. Averaging 30 days.     Weight: She has always struggled with weight. She lost 40 years a few years ago using weight watchers. She noticed in elementary school or tima high she was a little heavier than her friends.    GI - Constipation on and off in adulthood. She feels bloating is monthly. No diarrhea of nausea.     Skin She will get hives monthly with no known cause. She will have a rash on  below elbows that comes and goes throughout the year. Red raised and flaky     Thyroid: She did not feel like       Lifestyle Factors affecting health:   Diet - She is vegetarian for 15 years. Choice is animal lrightsShe takes collagen on occasion. She eats a lot of eggs. When she is eating better she has less dairy. She will have more dairy when she is eating \"bad\" Daughter is gluten free so she eats more gluten free. She will notice more bloating with cheese.     Exercise -Minimal exercise      Stress - Daughter has EOE and they cannot get it into remission. Work is stressful  they may be closing her department. She has a 3.5 year olf daughter, 5 year old son, 21 year old high functioning autism.     Sleep - She has disturbed sleep due to children. When she does not have children in her bed she can stay asleep and fall asleep     Other:   Pelvic floor prolapse   Pituitary adenoma - small growth since [id number]     Supplements - started ashwagandha last month   Phytomulti   BDNF essentials - cognition   Metabolism   ALLERGIES   No Known Allergies     CURRENT MEDICATIONS:     Current Outpatient Medications   Medication Sig Dispense Refill    Multiple Vitamin (MULTIVITAMIN ADULT OR) Take by mouth As Directed.      albuterol 108 (90 Base) MCG/ACT Inhalation Aero Soln Inhale 2 puffs into the lungs every 6 (six) hours as needed for Wheezing. 8.5 g 3    Magnesium 100 MG Oral Tab Take by mouth.      Zinc Sulfate (ZINC 15 OR) Take by mouth.      Ascorbic Acid (VITAMIN C) 100 MG Oral Tab Take 1 tablet (100 mg total) by mouth daily.      COLLAGEN OR Take by mouth.         MEDICAL HISTORY:     Past Medical History:   Diagnosis Date    Abnormal Pap smear of cervix     BRCA gene mutation negative in female 2022    Negative 54 gene hereditary cancer panel, report in media tab    Endometriosis     Hypothyroidism     no meds       SURGICAL HISTORY:     Past Surgical History:   Procedure Laterality Date          LTCS for FTP    Colposcopy, cervix w/upper adjacent vagina; w/biopsy(s), cervix N/A     Laparoscopy,pelvic,biopsy  2016    for endmetriois    Needle biopsy right  2022    us guided       FAMILY HISTORY:      Family History   Problem Relation Age of Onset    Pancreatic Cancer Mother 55         at 56y    Breast Cancer Other         dx >50y    Melanoma Other         dx >50y    Cancer Other         esophageal cancer, dx >50y    Schizophrenia Father     Stroke Maternal Grandmother         ?    Schizophrenia Paternal Uncle     Diabetes Neg     Glaucoma Neg     Macular  degeneration Neg        SOCIAL HISTORY:     Social History     Socioeconomic History    Marital status:    Tobacco Use    Smoking status: Former     Types: Cigarettes     Quit date:      Years since quittin.1    Smokeless tobacco: Never   Vaping Use    Vaping Use: Never used   Substance and Sexual Activity    Alcohol use: Yes     Comment: Social use    Drug use: Never    Sexual activity: Yes     Partners: Male     Birth control/protection: Vasectomy       REVIEW OF SYSTEMS:   Review of Systems   Constitutional:  Positive for fatigue. Negative for activity change, appetite change and unexpected weight change.   Gastrointestinal:  Positive for abdominal distention and constipation. Negative for abdominal pain and diarrhea.   Neurological:         Memory concerns   Word retrieval    Psychiatric/Behavioral:  Positive for decreased concentration. Negative for sleep disturbance.         PHYSICAL EXAM:     Vitals:    24 0734   BP: 108/60   BP Location: Right arm   Patient Position: Sitting   Cuff Size: adult   Pulse: 83   SpO2: 97%   Weight: 160 lb 6.4 oz (72.8 kg)   Height: 5' 4\" (1.626 m)       Physical Exam  Constitutional:       General: She is not in acute distress.     Appearance: Normal appearance. She is not ill-appearing, toxic-appearing or diaphoretic.   HENT:      Head: Normocephalic.   Cardiovascular:      Rate and Rhythm: Normal rate and regular rhythm.      Pulses: Normal pulses.      Heart sounds: Normal heart sounds. No murmur heard.  Pulmonary:      Effort: Pulmonary effort is normal. No respiratory distress.      Breath sounds: Normal breath sounds.   Neurological:      Mental Status: She is alert.          ASSESSMENT AND PLAN:     Patient is here for initial evaluation with me to discuss concerns with her memory and word retrieval.  We had in depth conversation regarding the hormonal cascade as well as got health.  I do have concerns that she has a leaky gut that could be  contributing to some of the inflammation creating insulin resistance.  Patient also has high stress in her life which could be contributing to a flattened cortisol curve or high current cortisol curve in the future we may want to consider cortisol saliva testing.    Patient discussed that she is having a lot more moodiness that she believes is the week before her cycle she will monitor this a little bit more closely.  This could be a sign of a luteal phase defect due to her being in her early 40s.  This is where we may start seeing some perimenopausal changes.  Blood work will be done during the luteal phase to evaluate this.    Patient has history of being on thyroid medication and she is not on it at this point in time.  When I evaluate her TSH it has been consistently at a 2.75 and all other numbers have been normal.  We will keep a close eye on this because this could be a sign of subclinical hypothyroidism where she may benefit from supplementation to help thyroid function.    Discussed there is signs of slight insulin resistance due to her fasting glucose and insulin being mildly elevated in the right from the range I would like them to be on.  Patient struggles with weight and has struggled with weight for years.  She also shows slight elevation in her LDL numbers.  Made dietary recommendations her to intake increase her protein and her fiber as well as decrease her neck carbs.  This may be challenging due to her being a vegetarian.    Recommendations for gut support are in patient instructions.    1. Hypothyroidism, unspecified type  - Thyroid Peroxidase (TPO) AB  - Free T4, (Free Thyroxine)  - Assay, Thyroid Stim Hormone  - Thyroid Antithyroglobulin AB  - Reverse T3, Serum  - Free T3 (Triiodothryronine)    2. Overweight (BMI 25.0-29.9)  - Testosterone,Total and Weakly Bound w/ SHBG  - Progesterone  - LH Fertility; Future  - FSH  - Estradiol  - Comp Metabolic Panel (14)  - Dehydroepiandrosterone Sulfate  -  Insulin  - Pregnenolone by MS/MS, Serum; Future    3. Memory loss  - Pregnenolone by MS/MS, Serum; Future    4. Vegetarian diet    5. Constipation, unspecified constipation type    6. Weight gain  - Insulin    7. Elevated glucose  - Insulin    8. Iron deficiency anemia, unspecified iron deficiency anemia type  - CBC With Differential With Platelet  - Iron And Tibc  - Ferritin      Time spent with patient: Over 60 minutes spent in chart review and in direct communication with patient obtaining and reviewing history, creating a unique care plan, explaining the rationale for treatment, reviewing potential SE and overall treatment plan,  documenting all clinical information in Epic. Over 50% of this time was in education, counseling and coordination of care.     Problem List Items Addressed This Visit          Endocrine and Metabolic    Hypothyroidism - Primary    Relevant Orders    Thyroid Peroxidase (TPO) AB    Free T4, (Free Thyroxine)    Assay, Thyroid Stim Hormone    Thyroid Antithyroglobulin AB    Reverse T3, Serum    Free T3 (Triiodothryronine)     Other Visit Diagnoses       Overweight (BMI 25.0-29.9)        Relevant Orders    Testosterone,Total and Weakly Bound w/ SHBG    Progesterone    LH Fertility    FSH    Estradiol    Comp Metabolic Panel (14)    Dehydroepiandrosterone Sulfate    Insulin    Pregnenolone by MS/MS, Serum    Memory loss        Relevant Orders    Pregnenolone by MS/MS, Serum    Vegetarian diet        Constipation, unspecified constipation type        Weight gain        Relevant Orders    Insulin    Elevated glucose        Relevant Orders    Insulin    Iron deficiency anemia, unspecified iron deficiency anemia type        Relevant Orders    CBC With Differential With Platelet    Iron And Tibc    Ferritin             Orders Placed This Visit:  Orders Placed This Encounter   Procedures    Testosterone,Total and Weakly Bound w/ SHBG    Progesterone    LH Fertility    FSH    Estradiol    Comp  Metabolic Panel (14)    Dehydroepiandrosterone Sulfate    CBC With Differential With Platelet    Thyroid Peroxidase (TPO) AB    Free T4, (Free Thyroxine)    Assay, Thyroid Stim Hormone    Thyroid Antithyroglobulin AB    Reverse T3, Serum    Free T3 (Triiodothryronine)    Insulin    Pregnenolone by MS/MS, Serum    Iron And Tibc    Ferritin     No orders of the defined types were placed in this encounter.      Patient Instructions   Fasting labs 2/12/24.     Things we discussed at our appointment.     Discussed how the gut being out of balance can affect all of your hormones. Constipation and bloating makes me concerned there is a leaky gut. We will start a leaky gut protocol. Supplement recommendations at the end.     Below is a hormonal pyramid on how hormones interacts:        Sex hormones - Concern there is a luteal phase defect which means progesterone is not spiking high enough before cycle which can cause mood changes. Blood work is ordered to detect luteal phase.     Thyroid - need to continue to watch thyroid. TSH being above two could mean you body is working hard to supple you with enough thyroid.     Cortisol levels (stress hormone) With ongoing stress you can have abnormalities in your cortisol resulting in memory and energy concerns. We may consider a cortisol saliva spot test in the future.     Insulin- There was a slight increase in fasting glucose and insulin. I am not too concerned about this but could be a contributing factor to your struggles with weight over the years.     Protein with every meal goal 75g a day  Increase fiber daily to help with bowel movements   Net carbs <30g per meal   Total carb - Fiber = Net Carb         One capsule with every meal          1 daily       Take 1 capsule daily, before a meal, or as directed by a health professional.           Serving Size: One Vegetarian Capsule    Amount Per Serving  Deglycyrrhizinated Licorice Extract ... 300mg  (glycyrrhiza  glabra)(root)  Aloe Vera Extract ... 50mg  (aloe barbadensis)(inner fillet)  (standardized to contain 10% polysaccharides)  Slippery Elm Extract ... 100mg  (ulmus fulva)(bark)  Marshmallow Root Extract ... 200mg  (althaea officinalis)(root)     The following website can be utilized to purchase supplements.     [email or web address]       FUTURE may consider adding NAC, antimicrobial GI effects, food sensitivity or cortisol testing     Fruit Carbohydrates per serving Fiber per serving Net carbs   Grapes (1Cup/151g)  26g 0.8g 25.2g   Banana (1 medium) 24g 3.1g 21g   Pear (1 medium) 22g 6g 16g   Apple (1 medium) 21g 4.4g 16.6g         Pineapple (1Cup/165g) 29g 2.3g 26.7   Blueberries (1Cup/148g) 17g 4g 13g   Cantaloupe (1Cup/177g) 14g 1.6g 12.4g   Oranges (1 medium) 12g 2.3g 9.7g   Watermelon (1Cup/154g) 12g 0.6g 11.4g   Peaches (1 Large) 9.5g 3g 6.5   Kiwi (1 medium) 9g 2.1g 6.9   Avocado (half of one) 9.5g 4.6g 4.9g   Strawberries (1Cup/144g) 8g 3 5g   Lime (1 fruit) 7g 1.9g 5.1g   Lemon (1 fruit) 6g 1.6g 4.4g   Tomatoes (1 fruit) 3.2g  1.5g 1.7g   Vegetables Carbohydrates per serving  Fiber per serving  Net carbs per serving    Asparagus (5 krishnan/93.5g) 4g 1.5g 2.5g   Bell Pepper (1 medium) 6g 2g 4g   Broccoli (1 medium stalk) 8g 6g 2g   Carrot (1 carrot 7.5inches long) 7g 1.7g 5.3g   Cauliflower (99g)  5g 2g 3g   Celery (2 medium stalks) 4g 2g 2g   Cucumber (? medium) 2g 1g 1g   Green Beans (3/4C)  5g 3g 2g   Green Cabbage (84g) 5g 2g 3g   Green Onion (1/4C chopped) 2g 1g 1g   Iceberg lettuce (89g)  2g 1.1g 0.9g   Jalapeno (1C sliced 6g 2.5g 3.5g   Leaf lettuce (1/2C shredded) 2g 1g 1.g   Mushrooms (5 medium) 3g 1g 2g   Onion (1 medium) 11g 1.9g 9g   Potatoe (1 medium/148g) 26g 2g 24g   Radishes (7 radishes) 3g 0.7g 2.3g   Summer Squash (½ medium) 4g 1.1g 2.9g   Sweet corn (1 medium ear) 18g 2.4g 15.6   Sweet Potato (1 medium) 23g 4g 19g             Return for 4-6 weeks 60 minutes can be  telehealth .    Patient affirmed understanding of plan and all questions were answered.     Alysha White PA-C

## 2024-02-09 ENCOUNTER — TELEMEDICINE (OUTPATIENT)
Dept: FAMILY MEDICINE CLINIC | Facility: CLINIC | Age: 44
End: 2024-02-09
Payer: COMMERCIAL

## 2024-02-09 DIAGNOSIS — M25.561 CHRONIC PAIN OF BOTH KNEES: Primary | ICD-10-CM

## 2024-02-09 DIAGNOSIS — N81.9 FEMALE GENITAL PROLAPSE, UNSPECIFIED TYPE: ICD-10-CM

## 2024-02-09 DIAGNOSIS — G89.29 CHRONIC PAIN OF BOTH KNEES: Primary | ICD-10-CM

## 2024-02-09 DIAGNOSIS — M25.562 CHRONIC PAIN OF BOTH KNEES: Primary | ICD-10-CM

## 2024-02-09 PROCEDURE — 99213 OFFICE O/P EST LOW 20 MIN: CPT | Performed by: NURSE PRACTITIONER

## 2024-02-09 RX ORDER — NAPROXEN 500 MG/1
500 TABLET ORAL 2 TIMES DAILY WITH MEALS
Qty: 28 TABLET | Refills: 0 | Status: SHIPPED | OUTPATIENT
Start: 2024-02-09 | End: 2024-02-23

## 2024-02-09 NOTE — PROGRESS NOTES
Due to the real risk of possible exposure to Coronavirus (CoV-2, COVID-19) in the office/medical building and recommendations for social distancing (key to mitigation/limiting the spread of the virus) a Virtual or Telemedicine visit over the phone was performed as below.     Patient has consented to the Virtual/Telephone Check-In service and expresses understanding and accepts financial responsibility for any deductible, co-insurance and/or co-pays associated with this service.    Telehealth outside of [facility]  Telehealth Verbal Consent   I conducted a telehealth visit with Abigail Puga today, 02/09/24, which was completed using two-way, real-time interactive audio and video communication. This has been done in good sameera to provide continuity of care in the best interest of the provider-patient relationship, due to the COVID -19 public health crisis/national emergency where restrictions of face-to-face office visits are ongoing. Every conscious effort was taken to allow for sufficient and adequate time to complete the visit.  The patient was made aware of the limitations of the telehealth visit, including treatment limitations as no physical exam could be performed.  The patient was advised to call 911 or to go to the ER in case there was an emergency.  The patient was also advised of the potential privacy & security concerns related to the telehealth platform.   The patient was made aware of where to find [facility]'s notice of privacy practices, telehealth consent form and other related consent forms and documents.  which are located on the [facility] website. The patient verbally agreed to telehealth consent form, related consents and the risks discussed.    Lastly, the patient confirmed that they were in [place].   Included in this visit, time may have been spent reviewing labs, medications, radiology tests and decision making. Appropriate medical decision-making and tests are ordered as detailed in the plan of care  above.  Coding/billing information is submitted for this visit based on complexity of care and/or time spent for the visit.    HPI:  Chief Complaint   Patient presents with    Follow - Up     Knee pain       Abigail Puga is a 44 year old female who calls for complaints of:    Knee pain L>R that has been going on for several months but has been worse in past 2 weeks. Feels like the knee clicks/creaks with movement. Has felt like it may give out at times, no falls. Denies hx of injury. No redness/swelling. Pain is in anterior knee, sometimes tender to touch. Does not exercise regularly. Pain is worse at rest. Pain is dull, intermittent, at worst 4/10. Feels like her knee is cold on the inside at times. Has not tried medication for this.     Hx pelvic organ prolapse and has received therapy in the past, would like to do this again.    ROS:  Negative except as above.    Physical Exam:  GEN:  Patient is alert, awake and oriented, well developed, well nourished.  LUNGS: Patient speaks clearly in full sentences without dyspnea, no cough while on video visit.  MSK: No visible knee redness/swelling from limited video exam.  PSYCH: Appropriate mood and affect.    No Known Allergies  Current Outpatient Medications   Medication Sig Dispense Refill    Multiple Vitamin (MULTIVITAMIN ADULT OR) Take by mouth As Directed.      albuterol 108 (90 Base) MCG/ACT Inhalation Aero Soln Inhale 2 puffs into the lungs every 6 (six) hours as needed for Wheezing. 8.5 g 3    Magnesium 100 MG Oral Tab Take by mouth.      Zinc Sulfate (ZINC 15 OR) Take by mouth.      Ascorbic Acid (VITAMIN C) 100 MG Oral Tab Take 1 tablet (100 mg total) by mouth daily.      COLLAGEN OR Take by mouth.       Past Medical History:   Diagnosis Date    Abnormal Pap smear of cervix 2012    BRCA gene mutation negative in female 08/2022    Negative 54 gene hereditary cancer panel, report in media tab    Endometriosis     Hypothyroidism     no meds     Past Surgical  History:   Procedure Laterality Date          LTCS for FTP    COLPOSCOPY, CERVIX W/UPPER ADJACENT VAGINA; W/BIOPSY(S), CERVIX N/A     LAPAROSCOPY,PELVIC,BIOPSY  2016    for endmetriois    NEEDLE BIOPSY RIGHT  2022    us guided         ASSESSMENT AND PLAN:   1. Patient is a 44 year old female who calls for: Knee pain    Additional Assessment and Plan:  Chronic pain of both knees  -Will refer to PT. She would prefer to go somewhere near her home-will call our office to notify us where to fax the order.   -Recommended RICE. May take naproxen BID x 2 weeks with food, no other NSAIDs while on this medication.  -Follow-up 6-8 weeks PRN if pain persists.    2. Pelvic organ prolapse  -Referred to PT per request. Would like to do this through [facility].      Follow up with me 6-8 weeks PRN    Call and/or go to the ER if worsening symptoms including, but not limited to: respiratory distress, shortness of breath and  wheezing, worsening fever, cough and mental status.      The patient (or patient's parent if <17 y/o) indicates understanding of the above recommendations and agrees to the above plan.    No orders of the defined types were placed in this encounter.      Meds & Refills for this Visit:  Requested Prescriptions      No prescriptions requested or ordered in this encounter       Imaging & Consults:  None    GANESH Quintero     Time spent during encounter: 15 minutes

## 2024-03-01 LAB
% SATURATION: 22 % (CALC) (ref 16–45)
ABSOLUTE BASOPHILS: 41 CELLS/UL (ref 0–200)
ABSOLUTE EOSINOPHILS: 680 CELLS/UL (ref 15–500)
ABSOLUTE LYMPHOCYTES: 2989 CELLS/UL (ref 850–3900)
ABSOLUTE MONOCYTES: 478 CELLS/UL (ref 200–950)
ABSOLUTE NEUTROPHILS: 3912 CELLS/UL (ref 1500–7800)
ALBUMIN/GLOBULIN RATIO: 1.7 (CALC) (ref 1–2.5)
ALBUMIN: 4.3 G/DL (ref 3.6–5.1)
ALKALINE PHOSPHATASE: 48 U/L (ref 31–125)
ALT: 12 U/L (ref 6–29)
AST: 15 U/L (ref 10–30)
BASOPHILS: 0.5 %
BILIRUBIN, TOTAL: 0.4 MG/DL (ref 0.2–1.2)
BUN: 14 MG/DL (ref 7–25)
CALCIUM: 9.3 MG/DL (ref 8.6–10.2)
CARBON DIOXIDE: 24 MMOL/L (ref 20–32)
CHLORIDE: 105 MMOL/L (ref 98–110)
CREATININE: 0.71 MG/DL (ref 0.5–0.99)
DHEA SULFATE: 68 MCG/DL (ref 15–205)
EGFR: 108 ML/MIN/1.73M2
EOSINOPHILS: 8.4 %
ESTRADIOL: 118 PG/ML
FERRITIN: 22 NG/ML (ref 16–232)
FREE TESTOSTERONE: 0.7 PG/ML (ref 0.1–6.4)
FSH: 4.3 MIU/ML
GLOBULIN: 2.6 G/DL (CALC) (ref 1.9–3.7)
GLUCOSE: 87 MG/DL (ref 65–99)
HEMATOCRIT: 38.2 % (ref 35–45)
HEMOGLOBIN: 12.5 G/DL (ref 11.7–15.5)
INSULIN: 9.1 UIU/ML
IRON BINDING CAPACITY: 348 MCG/DL (CALC) (ref 250–450)
IRON, TOTAL: 77 MCG/DL (ref 40–190)
LYMPHOCYTES: 36.9 %
MCH: 29.1 PG (ref 27–33)
MCHC: 32.7 G/DL (ref 32–36)
MCV: 89 FL (ref 80–100)
MONOCYTES: 5.9 %
MPV: 10.4 FL (ref 7.5–12.5)
NEUTROPHILS: 48.3 %
PLATELET COUNT: 320 THOUSAND/UL (ref 140–400)
POTASSIUM: 4.3 MMOL/L (ref 3.5–5.3)
PROGESTERONE: 19.8 NG/ML
PROTEIN, TOTAL: 6.9 G/DL (ref 6.1–8.1)
RDW: 12.4 % (ref 11–15)
RED BLOOD CELL COUNT: 4.29 MILLION/UL (ref 3.8–5.1)
SODIUM: 138 MMOL/L (ref 135–146)
T3 REVERSE, /MS/MS: 12 NG/DL (ref 8–25)
T3, FREE: 3.1 PG/ML (ref 2.3–4.2)
T4, FREE: 0.9 NG/DL (ref 0.8–1.8)
TESTOSTERONE, TOTAL,$/MS/MS: 12 NG/DL (ref 2–45)
THYROGLOBULIN ANTIBODIES: <1 IU/ML
THYROID PEROXIDASE$ANTIBODIES: 1 IU/ML
TSH: 4.78 MIU/L
WHITE BLOOD CELL COUNT: 8.1 THOUSAND/UL (ref 3.8–10.8)

## 2024-08-16 PROBLEM — I49.3 PVC'S (PREMATURE VENTRICULAR CONTRACTIONS): Status: ACTIVE | Noted: 2023-11-07

## 2024-08-16 PROBLEM — R00.2 PALPITATIONS: Status: ACTIVE | Noted: 2023-11-07

## 2024-08-16 PROBLEM — K20.0 EOSINOPHILIC ESOPHAGITIS: Status: ACTIVE | Noted: 2023-12-18

## 2024-08-16 RX ORDER — OFLOXACIN 3 MG/ML
SOLUTION/ DROPS OPHTHALMIC
COMMUNITY
Start: 2024-02-25 | End: 2024-08-19

## 2024-08-19 ENCOUNTER — OFFICE VISIT (OUTPATIENT)
Dept: FAMILY MEDICINE CLINIC | Facility: CLINIC | Age: 44
End: 2024-08-19
Payer: COMMERCIAL

## 2024-08-19 VITALS
TEMPERATURE: 98 F | BODY MASS INDEX: 27.96 KG/M2 | WEIGHT: 163.81 LBS | DIASTOLIC BLOOD PRESSURE: 80 MMHG | OXYGEN SATURATION: 98 % | SYSTOLIC BLOOD PRESSURE: 122 MMHG | HEIGHT: 64 IN | HEART RATE: 84 BPM

## 2024-08-19 DIAGNOSIS — I49.3 PVC'S (PREMATURE VENTRICULAR CONTRACTIONS): ICD-10-CM

## 2024-08-19 DIAGNOSIS — Z00.00 ADULT GENERAL MEDICAL EXAMINATION: Primary | ICD-10-CM

## 2024-08-19 DIAGNOSIS — E03.9 HYPOTHYROIDISM, UNSPECIFIED TYPE: ICD-10-CM

## 2024-08-19 DIAGNOSIS — Z12.31 SCREENING MAMMOGRAM, ENCOUNTER FOR: ICD-10-CM

## 2024-08-19 DIAGNOSIS — D35.2 PITUITARY ADENOMA (HCC): ICD-10-CM

## 2024-08-19 PROBLEM — G44.209 TENSION-TYPE HEADACHE: Status: RESOLVED | Noted: 2022-01-27 | Resolved: 2024-08-19

## 2024-08-19 PROBLEM — R41.82 ALTERED MENTAL STATUS: Status: RESOLVED | Noted: 2022-01-27 | Resolved: 2024-08-19

## 2024-08-19 PROBLEM — R00.2 PALPITATIONS: Status: RESOLVED | Noted: 2023-11-07 | Resolved: 2024-08-19

## 2024-08-19 PROBLEM — R79.89 ELEVATED LFTS: Status: RESOLVED | Noted: 2019-11-21 | Resolved: 2024-08-19

## 2024-08-19 PROCEDURE — 99396 PREV VISIT EST AGE 40-64: CPT | Performed by: NURSE PRACTITIONER

## 2024-08-19 RX ORDER — FLUOXETINE 10 MG/1
CAPSULE ORAL
COMMUNITY
Start: 2024-07-13

## 2024-08-19 NOTE — PROGRESS NOTES
Chief Complaint:   Chief Complaint   Patient presents with    Physical       HPI:   This is a 44 year old female coming in for physical. Feels generally well. Continues to have palpitations daily, worse at night when she is relaxing and quiet. Has seen cardiology and had workup and no cause found. She drinks 1 cup coffee/day in the morning, has tried cutting that out without improvement. Started fluoxetine recently for anxiety, has been on that about a month and thinks it is helping overall. No chest pain, dyspnea, syncope or near-syncope.     Results for orders placed or performed in visit on 02/08/24   Testosterone,Total and Weakly Bound w/ SHBG   Result Value Ref Range    TESTOSTERONE, TOTAL,$LC/MS/MS 12 2 - 45 ng/dL    FREE TESTOSTERONE 0.7 0.1 - 6.4 pg/mL   Progesterone   Result Value Ref Range    PROGESTERONE 19.8 ng/mL   FSH   Result Value Ref Range    FSH 4.3 mIU/mL   Estradiol   Result Value Ref Range    ESTRADIOL 118 pg/mL   Comp Metabolic Panel (14)   Result Value Ref Range    GLUCOSE 87 65 - 99 mg/dL    UREA NITROGEN (BUN) 14 7 - 25 mg/dL    CREATININE 0.71 0.50 - 0.99 mg/dL    EGFR 108 > OR = 60 mL/min/1.73m2    BUN/CREATININE RATIO SEE NOTE: 6 - 22 (calc)    SODIUM 138 135 - 146 mmol/L    POTASSIUM 4.3 3.5 - 5.3 mmol/L    CHLORIDE 105 98 - 110 mmol/L    CARBON DIOXIDE 24 20 - 32 mmol/L    CALCIUM 9.3 8.6 - 10.2 mg/dL    PROTEIN, TOTAL 6.9 6.1 - 8.1 g/dL    ALBUMIN 4.3 3.6 - 5.1 g/dL    GLOBULIN 2.6 1.9 - 3.7 g/dL (calc)    ALBUMIN/GLOBULIN RATIO 1.7 1.0 - 2.5 (calc)    BILIRUBIN, TOTAL 0.4 0.2 - 1.2 mg/dL    ALKALINE PHOSPHATASE 48 31 - 125 U/L    AST 15 10 - 30 U/L    ALT 12 6 - 29 U/L   Dehydroepiandrosterone Sulfate   Result Value Ref Range    DHEA SULFATE 68 15 - 205 mcg/dL   CBC With Differential With Platelet   Result Value Ref Range    WHITE BLOOD CELL COUNT 8.1 3.8 - 10.8 Thousand/uL    RED BLOOD CELL COUNT 4.29 3.80 - 5.10 Million/uL    HEMOGLOBIN 12.5 11.7 - 15.5 g/dL    HEMATOCRIT 38.2 35.0  - 45.0 %    MCV 89.0 80.0 - 100.0 fL    MCH 29.1 27.0 - 33.0 pg    MCHC 32.7 32.0 - 36.0 g/dL    RDW 12.4 11.0 - 15.0 %    PLATELET COUNT 320 140 - 400 Thousand/uL    MPV 10.4 7.5 - 12.5 fL    ABSOLUTE NEUTROPHILS 3,912 1,500 - 7,800 cells/uL    ABSOLUTE LYMPHOCYTES 2,989 850 - 3,900 cells/uL    ABSOLUTE MONOCYTES 478 200 - 950 cells/uL    ABSOLUTE EOSINOPHILS 680 (H) 15 - 500 cells/uL    ABSOLUTE BASOPHILS 41 0 - 200 cells/uL    NEUTROPHILS 48.3 %    LYMPHOCYTES 36.9 %    MONOCYTES 5.9 %    EOSINOPHILS 8.4 %    BASOPHILS 0.5 %   Thyroid Peroxidase (TPO) AB   Result Value Ref Range    THYROID PEROXIDASE$ANTIBODIES 1 <9 IU/mL   Free T4, (Free Thyroxine)   Result Value Ref Range    T4, FREE 0.9 0.8 - 1.8 ng/dL   Assay, Thyroid Stim Hormone   Result Value Ref Range    TSH 4.78 (H) mIU/L   Thyroid Antithyroglobulin AB   Result Value Ref Range    THYROGLOBULIN ANTIBODIES <1 < or = 1 IU/mL   Reverse T3, Serum   Result Value Ref Range    T3 REVERSE, LC/MS/MS 12 8 - 25 ng/dL   Free T3 (Triiodothryronine)   Result Value Ref Range    T3, FREE 3.1 2.3 - 4.2 pg/mL   Insulin   Result Value Ref Range    INSULIN 9.1 uIU/mL    Iron And Tibc   Result Value Ref Range    IRON, TOTAL 77 40 - 190 mcg/dL    IRON BINDING CAPACITY 348 250 - 450 mcg/dL (calc)    % SATURATION 22 16 - 45 % (calc)   Ferritin   Result Value Ref Range    FERRITIN 22 16 - 232 ng/mL             Past Medical History:    Abnormal Pap smear of cervix    BRCA gene mutation negative in female    Negative 54 gene hereditary cancer panel, report in media tab    Endometriosis    Hypothyroidism    no meds     Past Surgical History:   Procedure Laterality Date          LTCS for FTP    Colposcopy, cervix w/upper adjacent vagina; w/biopsy(s), cervix N/A     Laparoscopy,pelvic,biopsy  2016    for endmetriois    Needle biopsy right  2022    us guided     Social History:  Social History     Socioeconomic History    Marital status:    Tobacco Use     Smoking status: Former     Current packs/day: 0.00     Types: Cigarettes     Quit date:      Years since quittin.6    Smokeless tobacco: Never   Vaping Use    Vaping status: Never Used   Substance and Sexual Activity    Alcohol use: Yes     Comment: Social use    Drug use: Never    Sexual activity: Yes     Partners: Male     Birth control/protection: Vasectomy     Social Determinants of Health     Financial Resource Strain: Low Risk  (2023)    Received from [facility]    Overall Financial Resource Strain (CARDIA)     Difficulty of Paying Living Expenses: Not very hard   Food Insecurity: No Food Insecurity (2023)    Received from [facility]    Hunger Vital Sign     Worried About Running Out of Food in the Last Year: Never true     Ran Out of Food in the Last Year: Never true   Transportation Needs: No Transportation Needs (2023)    Received from [facility]    PRAPARE - Transportation     Lack of Transportation (Medical): No     Lack of Transportation (Non-Medical): No   Physical Activity: Insufficiently Active (2023)    Received from [facility]    Exercise Vital Sign     Days of Exercise per Week: 5 days     Minutes of Exercise per Session: 20 min   Housing Stability: Low Risk  (2023)    Received from [facility]    Housing Stability     What is your living situation today?: I have a steady place to live     Family History:  Family History   Problem Relation Age of Onset    Pancreatic Cancer Mother 55         at 56y    Breast Cancer Other         dx >50y    Melanoma Other         dx >50y    Cancer Other         esophageal cancer, dx >50y    Schizophrenia Father     Stroke Maternal Grandmother         ?    Schizophrenia Paternal Uncle     Diabetes Neg     Glaucoma Neg     Macular degeneration Neg      Allergies:  No Known Allergies  Current Meds:  Current Outpatient Medications   Medication Sig Dispense  Refill    FLUoxetine 10 MG Oral Cap       Multiple Vitamin (MULTIVITAMIN ADULT OR) Take by mouth As Directed.      albuterol 108 (90 Base) MCG/ACT Inhalation Aero Soln Inhale 2 puffs into the lungs every 6 (six) hours as needed for Wheezing. 8.5 g 3    Magnesium 100 MG Oral Tab Take by mouth.      Zinc Sulfate (ZINC 15 OR) Take by mouth.      Ascorbic Acid (VITAMIN C) 100 MG Oral Tab Take 1 tablet (100 mg total) by mouth daily.      COLLAGEN OR Take by mouth.        Counseling given: Not Answered       REVIEW OF SYSTEMS:   CONSTITUTIONAL:  Denies unusual weight gain/loss, fever, chills, or fatigue.  HEENT:  Eyes:  Denies eye pain, visual loss, blurred vision. Denies hearing loss, sneezing, congestion, runny nose or sore throat.  INTEGUMENTARY:  Denies rashes, itching, skin lesion.  CARDIOVASCULAR:  Denies chest pain, edema, dyspnea on exertion or at rest. Palpitations as per HPI, sees cardiology.  RESPIRATORY:  Denies shortness of breath, wheezing, cough or sputum.  GASTROINTESTINAL:  Denies abdominal pain, nausea, vomiting, constipation, diarrhea, or blood in stool.  GENITOURINARY: Denies dysuria, hematuria, frequency.  MUSCULOSKELETAL:  Denies weakness, muscle aches, back pain, joint pain, swelling or stiffness.  NEUROLOGICAL:  Denies headache, seizures, dizziness.  PSYCHIATRIC:  Denies depression. +Anxiety, improved on fluoxetine. Denies suicidal thoughts.    EXAM:   /80   Pulse 84   Temp 97.7 °F (36.5 °C)   Ht 5' 4\" (1.626 m)   Wt 163 lb 12.8 oz (74.3 kg)   LMP 08/09/2024   SpO2 98%   BMI 28.12 kg/m²  Estimated body mass index is 28.12 kg/m² as calculated from the following:    Height as of this encounter: 5' 4\" (1.626 m).    Weight as of this encounter: 163 lb 12.8 oz (74.3 kg).   Vital signs reviewed.Appears stated age, well groomed, in no acute distress.  Physical Exam:  GEN:  Patient is alert, awake and oriented, well developed, well nourished.  HEENT:  Head:  Normocephalic, atraumatic Eyes:  EOMI, PERRLA, conjunctivae clear bilaterally, no eye discharge Ears: External normal, TM clear bilaterally. Nose: patent, no nasal discharge. Throat:  No tonsillar erythema or exudate.  Mouth:  No oral lesions, good dentition.  NECK: Supple, no CLAD, no thyromegaly.  SKIN: No rashes, no skin lesion, no bruising, good turgor.  HEART:  Regular rate and rhythm, no murmurs, rubs or gallops.  LUNGS: Clear to auscultation bilterally, no rales/rhonchi/wheezing.  BREASTS: Symmetrical, no palpable lump or axillary lymphadenopathy bilaterally. No nipple discharge/retraction BL.  ABDOMEN:  Soft, nondistended, nontender, bowel sounds normal in all 4 quadrants, no masses, no hepatosplenomegaly.  BACK: No tenderness to palpation, FROM.  EXTREMITIES:  No edema, no cyanosis, no clubbing, FROM, 2+ dorsalis pedis pulses bilaterally.  NEURO:  No deficit, normal gait, strength and tone, sensory intact, normal reflexes.    ASSESSMENT AND PLAN:   1. Adult general medical examination  -Healthy diet and regular exercise.  -Annual eye exam and biannual dental visits.  -Labs as below.  - Hemoglobin A1C  - Lipid Panel  - TSH W Reflex To Free T4 [E]    2. Hypothyroidism, unspecified type  -Will repeat TSH, not on medication.  - TSH W Reflex To Free T4 [E]    3. Pituitary adenoma (HCC)  -Will repeat MRI, last done 12/2022.  - MRI PITUITARY (W+WO) (CPT=70553); Future    4. PVC's (premature ventricular contractions)  -Followed by cardiology.    5. Screening mammogram, encounter for  - [facility] SPIKE 2D+3D SCREENING BILAT (CPT=77067/51107); Future      Meds & Refills for this Visit:  Requested Prescriptions      No prescriptions requested or ordered in this encounter         Problem List:  Patient Active Problem List   Diagnosis    Hypothyroidism    Pituitary adenoma (HCC)    Myopia of both eyes with regular astigmatism    Hair loss    Spells of decreased attentiveness    Parasomnia    BRCA gene mutation negative in female    Irregular menstrual cycle     Perimenopausal symptoms    PVC's (premature ventricular contractions)    Eosinophilic esophagitis       Jolene Mccall, APRN  8/19/2024  4:49 PM

## 2024-10-12 ENCOUNTER — HOSPITAL ENCOUNTER (OUTPATIENT)
Dept: MAMMOGRAPHY | Facility: HOSPITAL | Age: 44
Discharge: HOME | End: 2024-10-12
Attending: NURSE PRACTITIONER
Payer: COMMERCIAL

## 2024-10-12 DIAGNOSIS — Z12.31 SCREENING MAMMOGRAM, ENCOUNTER FOR: ICD-10-CM

## 2024-10-12 PROCEDURE — 77067 SCR MAMMO BI INCL CAD: CPT | Performed by: NURSE PRACTITIONER

## 2024-10-12 PROCEDURE — 77063 BREAST TOMOSYNTHESIS BI: CPT | Performed by: NURSE PRACTITIONER

## 2024-11-25 ENCOUNTER — MED REC SCAN ONLY (OUTPATIENT)
Dept: FAMILY MEDICINE CLINIC | Facility: CLINIC | Age: 44
End: 2024-11-25

## 2024-12-06 ENCOUNTER — APPOINTMENT (OUTPATIENT)
Dept: GENERAL RADIOLOGY | Age: 44
End: 2024-12-06
Attending: PHYSICIAN ASSISTANT
Payer: COMMERCIAL

## 2024-12-06 ENCOUNTER — HOSPITAL ENCOUNTER (OUTPATIENT)
Age: 44
Discharge: HOME | End: 2024-12-06
Payer: COMMERCIAL

## 2024-12-06 VITALS
TEMPERATURE: 98 F | RESPIRATION RATE: 18 BRPM | OXYGEN SATURATION: 97 % | DIASTOLIC BLOOD PRESSURE: 86 MMHG | HEART RATE: 76 BPM | SYSTOLIC BLOOD PRESSURE: 144 MMHG

## 2024-12-06 DIAGNOSIS — Z20.822 ENCOUNTER FOR LABORATORY TESTING FOR COVID-19 VIRUS: ICD-10-CM

## 2024-12-06 DIAGNOSIS — J45.21 MILD INTERMITTENT ASTHMA WITH EXACERBATION (HCC): Primary | ICD-10-CM

## 2024-12-06 DIAGNOSIS — R05.1 ACUTE COUGH: ICD-10-CM

## 2024-12-06 LAB — SARS-COV-2 RNA RESP QL NAA+PROBE: NOT DETECTED

## 2024-12-06 PROCEDURE — 99213 OFFICE O/P EST LOW 20 MIN: CPT | Performed by: PHYSICIAN ASSISTANT

## 2024-12-06 PROCEDURE — 71046 X-RAY EXAM CHEST 2 VIEWS: CPT | Performed by: PHYSICIAN ASSISTANT

## 2024-12-06 PROCEDURE — U0002 COVID-19 LAB TEST NON-CDC: HCPCS | Performed by: PHYSICIAN ASSISTANT

## 2024-12-06 PROCEDURE — 94640 AIRWAY INHALATION TREATMENT: CPT | Performed by: PHYSICIAN ASSISTANT

## 2024-12-06 RX ORDER — PREDNISONE 20 MG/1
40 TABLET ORAL DAILY
Qty: 10 TABLET | Refills: 0 | Status: SHIPPED | OUTPATIENT
Start: 2024-12-06 | End: 2024-12-09

## 2024-12-06 RX ORDER — BENZONATATE 100 MG/1
100 CAPSULE ORAL 3 TIMES DAILY PRN
Qty: 30 CAPSULE | Refills: 0 | Status: SHIPPED | OUTPATIENT
Start: 2024-12-06 | End: 2025-01-05

## 2024-12-06 RX ORDER — BENZOCAINE/MENTH/CETYLPYRD CL 15 MG-2 MG
1 LOZENGE MUCOUS MEMBRANE EVERY 6 HOURS PRN
Qty: 20 LOZENGE | Refills: 0 | Status: SHIPPED | OUTPATIENT
Start: 2024-12-06 | End: 2024-12-09

## 2024-12-06 RX ORDER — IPRATROPIUM BROMIDE AND ALBUTEROL SULFATE 2.5; .5 MG/3ML; MG/3ML
3 SOLUTION RESPIRATORY (INHALATION) ONCE
Status: COMPLETED | OUTPATIENT
Start: 2024-12-06 | End: 2024-12-06

## 2024-12-06 RX ORDER — PREDNISONE 20 MG/1
60 TABLET ORAL ONCE
Status: COMPLETED | OUTPATIENT
Start: 2024-12-06 | End: 2024-12-06

## 2024-12-06 RX ORDER — AMOXICILLIN 500 MG/1
500 CAPSULE ORAL 2 TIMES DAILY
COMMUNITY
Start: 2024-12-02

## 2024-12-06 RX ORDER — ALBUTEROL SULFATE 90 UG/1
2 INHALANT RESPIRATORY (INHALATION) EVERY 4 HOURS PRN
Qty: 1 EACH | Refills: 0 | Status: SHIPPED | OUTPATIENT
Start: 2024-12-06 | End: 2025-01-05

## 2024-12-06 NOTE — ED PROVIDER NOTES
Patient Seen in: [facility]      History     Chief Complaint   Patient presents with    Cough/URI     Stated Complaint: Cough - Family is sick strep and flu. I have been sick since last Wednesday and*    Subjective:   HPI    Patient is a 44-year-old female with asthma, presenting to immediate care for evaluation of cold-like symptoms for last 9 days.  Associated nasal congestion, postnasal drip, sore throat, chest congestion, and cough.  Patient had telemedicine visit 4 days ago and was treated for presumed strep throat given multiple change to strep throat at home.  Was prescribed oral antibiotics amoxicillin.  She endorses slight improvement of symptoms.  However having intermittent nonproductive cough.  Increased use of her albuterol inhaler.  States has been diagnosed with asthma which flares up with a cold or change in the weather.  She denies any fever.  Occasional shortness of breath with exertion.  No chest pain.      Objective:     Past Medical History:    Abnormal Pap smear of cervix    BRCA gene mutation negative in female    Negative 54 gene hereditary cancer panel, report in media tab    Endometriosis    Hypothyroidism    no meds              Past Surgical History:   Procedure Laterality Date          LTCS for FTP    Colposcopy, cervix w/upper adjacent vagina; w/biopsy(s), cervix N/A     Laparoscopy,pelvic,biopsy  2016    for endmetriois    Needle biopsy left Left 2021    Needle biopsy right  2022    us guided                Social History     Socioeconomic History    Marital status:    Tobacco Use    Smoking status: Former     Current packs/day: 0.00     Types: Cigarettes     Quit date:      Years since quittin.9    Smokeless tobacco: Never   Vaping Use    Vaping status: Never Used   Substance and Sexual Activity    Alcohol use: Yes     Comment: Social use    Drug use: Never    Sexual activity: Yes     Partners: Male     Birth  control/protection: Vasectomy     Social Drivers of Health     Financial Resource Strain: Low Risk  (12/12/2023)    Received from [facility]    Overall Financial Resource Strain (CARDIA)     Difficulty of Paying Living Expenses: Not very hard   Food Insecurity: No Food Insecurity (12/12/2023)    Received from [facility]    Hunger Vital Sign     Worried About Running Out of Food in the Last Year: Never true     Ran Out of Food in the Last Year: Never true   Transportation Needs: No Transportation Needs (12/12/2023)    Received from [facility]    PRAPARE - Transportation     Lack of Transportation (Medical): No     Lack of Transportation (Non-Medical): No   Physical Activity: Insufficiently Active (12/12/2023)    Received from [facility]    Exercise Vital Sign     Days of Exercise per Week: 5 days     Minutes of Exercise per Session: 20 min   Housing Stability: Low Risk  (12/12/2023)    Received from [facility]    Housing Stability     What is your living situation today?: I have a steady place to live              Review of Systems   Constitutional:  Negative for chills and fever.   HENT:  Positive for congestion and sore throat. Negative for facial swelling, trouble swallowing and voice change.    Respiratory:  Positive for cough, chest tightness and wheezing. Negative for shortness of breath.    Cardiovascular:  Negative for chest pain.   Gastrointestinal:  Negative for abdominal pain, nausea and vomiting.   Musculoskeletal:  Negative for back pain, gait problem, neck pain and neck stiffness.   Skin:  Negative for rash.   Allergic/Immunologic: Negative for immunocompromised state.   Neurological:  Negative for dizziness, weakness, light-headedness and headaches.   Hematological:  Does not bruise/bleed easily.   Psychiatric/Behavioral:  Negative for confusion.    All other systems reviewed and are negative.      Positive for stated complaint:  Cough - Family is sick strep and flu. I have been sick since last Wednesday and*  Other systems are as noted in HPI.  Constitutional and vital signs reviewed.      All other systems reviewed and negative except as noted above.    Physical Exam     ED Triage Vitals [12/06/24 1255]   /86   Pulse 76   Resp 18   Temp 98.1 °F (36.7 °C)   Temp src Oral   SpO2 97 %   O2 Device None (Room air)       Current Vitals:   Vital Signs  BP: 144/86  Pulse: 76  Resp: 18  Temp: 98.1 °F (36.7 °C)  Temp src: Oral    Oxygen Therapy  SpO2: 97 %  O2 Device: None (Room air)        Physical Exam  Vitals and nursing note reviewed.   Constitutional:       General: She is not in acute distress.     Appearance: Normal appearance. She is not ill-appearing, toxic-appearing or diaphoretic.   HENT:      Head: Normocephalic and atraumatic.      Right Ear: Tympanic membrane normal.      Left Ear: Tympanic membrane normal.      Nose: Congestion present.      Comments: Postnasal drip.     Mouth/Throat:      Mouth: Mucous membranes are moist.      Pharynx: No oropharyngeal exudate or posterior oropharyngeal erythema.      Comments: Uvula midline.  No pharyngeal erythema.  No trismus or drooling.  Eyes:      Conjunctiva/sclera: Conjunctivae normal.   Cardiovascular:      Rate and Rhythm: Normal rate and regular rhythm.      Pulses: Normal pulses.   Pulmonary:      Breath sounds: No stridor. Wheezing present. No rhonchi.      Comments: Bilateral expiratory wheezing all lung fields.  No conversational dyspnea.  No retractions.  No increased work of breathing  Musculoskeletal:         General: No swelling or tenderness. Normal range of motion.      Cervical back: Normal range of motion. No rigidity.   Lymphadenopathy:      Cervical: No cervical adenopathy.   Skin:     Capillary Refill: Capillary refill takes less than 2 seconds.   Neurological:      General: No focal deficit present.      Mental Status: She is alert and oriented to person, place, and  time.      Motor: No weakness.      Gait: Gait normal.   Psychiatric:         Mood and Affect: Mood normal.         Behavior: Behavior normal.         Thought Content: Thought content normal.         Judgment: Judgment normal.           ED Course     Labs Reviewed   RAPID SARS-COV-2 BY PCR - Normal     Results for orders placed or performed during the hospital encounter of 12/06/24   Rapid SARS-CoV-2 by PCR    Collection Time: 12/06/24  1:08 PM    Specimen: Nares; Other   Result Value Ref Range    Rapid SARS-CoV-2 by PCR Not Detected Not Detected     XR CHEST PA + LAT CHEST (CPT=71046)   Final Result   PROCEDURE: XR CHEST PA + LAT CHEST (CPT=71046)       COMPARISON: None.       INDICATIONS: Cough x 1 week with new onset of wheezing.       TECHNIQUE:   Two views.         FINDINGS:    CARDIAC/VASC: Normal.  No cardiac silhouette abnormality or cardiomegaly.     Unremarkable pulmonary vasculature.     MEDIAST/ALFRED: No visible mass or adenopathy.    LUNGS/PLEURA: Normal.  No significant pulmonary parenchymal abnormalities.     No effusion or pleural thickening.     BONES: No fracture or visible bony lesion.  Mild mid thoracic    dextroscoliosis.   OTHER: Negative.                     =====   CONCLUSION:    1. No acute disease in the chest.               Dictated by (CST): Yuri Fox MD on 12/06/2024 at 1:41 PM        Finalized by (CST): Yuri Fox MD on 12/06/2024 at 1:41 PM                        MDM     - Dx: Acute asthma exacerbation, mild, intermittent, initial encounter  - Recent Viral Illness  - COVID PCR negative  - Overall well-appearing  - Lungs: Expiratory wheezing  - CXR: No pneumonia  - Given DuoNeb for wheezing, symptomatically improved  - No hypoxia  - No conversational dyspnea  - No respiratory distress  - Afebrile in ED  - No signs of dehydration  - Tolerating PO  - Plan: Outpatient management  - Suppotive care  - OTC motrin/tylenol prn fevers/maylgias  - Mucinex dm and tessalon for cough  - Rx  prednisone 5 day course for bronchitis  - Rx Albuterol inhaler  sob/wheezing  - Spacer provided in clinic  - Cepacol lozenges for sore throat  - Discusses ED strict return precautions  - F/u PCP          Medical Decision Making      Disposition and Plan     Clinical Impression:  1. Mild intermittent asthma with exacerbation (HCC)    2. Encounter for laboratory testing for COVID-19 virus    3. Acute cough         Disposition:  Discharge  12/6/2024  1:46 pm    Follow-up:  Surinder Walter MD  [address] [place] [id number]  [phone]                Medications Prescribed:  Current Discharge Medication List        START taking these medications    Details   predniSONE 20 MG Oral Tab Take 2 tablets (40 mg total) by mouth daily for 5 days.  Qty: 10 tablet, Refills: 0      benzonatate 100 MG Oral Cap Take 1 capsule (100 mg total) by mouth 3 (three) times daily as needed for cough.  Qty: 30 capsule, Refills: 0      Benzocaine-Menthol (CEPACOL SORE THROAT) 10-2.1 MG Mouth/Throat Lozenge Use as directed 1 lozenge in the mouth or throat every 6 (six) hours as needed.  Qty: 20 lozenge, Refills: 0      !! albuterol 108 (90 Base) MCG/ACT Inhalation Aero Soln Inhale 2 puffs into the lungs every 4 (four) hours as needed for Wheezing.  Qty: 1 each, Refills: 0       !! - Potential duplicate medications found. Please discuss with provider.              Supplementary Documentation:

## 2024-12-06 NOTE — ED INITIAL ASSESSMENT (HPI)
Pt presents to the IC with c/o a cough for the last 9 days, coupled with occasional SOB and sore throat. Pt has a telehealth visit 4 days ago and was prescribed amoxicillin for possible strep throat after exposure at home to strep and flu. Pt is feeling slightly better but the cough remains. Pt has needed to use her inhaler, last taken last night, with improvement.

## 2024-12-08 ENCOUNTER — HOSPITAL ENCOUNTER (OUTPATIENT)
Age: 44
Discharge: HOME | End: 2024-12-08
Payer: COMMERCIAL

## 2024-12-08 ENCOUNTER — APPOINTMENT (OUTPATIENT)
Dept: GENERAL RADIOLOGY | Age: 44
End: 2024-12-08
Attending: PHYSICIAN ASSISTANT
Payer: COMMERCIAL

## 2024-12-08 VITALS
TEMPERATURE: 98 F | HEART RATE: 65 BPM | RESPIRATION RATE: 18 BRPM | OXYGEN SATURATION: 96 % | DIASTOLIC BLOOD PRESSURE: 68 MMHG | SYSTOLIC BLOOD PRESSURE: 131 MMHG

## 2024-12-08 DIAGNOSIS — R05.1 ACUTE COUGH: ICD-10-CM

## 2024-12-08 DIAGNOSIS — J45.909 ACUTE ASTHMA (HCC): Primary | ICD-10-CM

## 2024-12-08 DIAGNOSIS — R06.00 DYSPNEA, UNSPECIFIED TYPE: ICD-10-CM

## 2024-12-08 PROCEDURE — 71046 X-RAY EXAM CHEST 2 VIEWS: CPT | Performed by: PHYSICIAN ASSISTANT

## 2024-12-08 PROCEDURE — 99213 OFFICE O/P EST LOW 20 MIN: CPT | Performed by: PHYSICIAN ASSISTANT

## 2024-12-08 PROCEDURE — 94640 AIRWAY INHALATION TREATMENT: CPT | Performed by: PHYSICIAN ASSISTANT

## 2024-12-08 RX ORDER — IPRATROPIUM BROMIDE AND ALBUTEROL SULFATE 2.5; .5 MG/3ML; MG/3ML
3 SOLUTION RESPIRATORY (INHALATION) EVERY 6 HOURS PRN
Qty: 30 EACH | Refills: 0 | Status: SHIPPED | OUTPATIENT
Start: 2024-12-08

## 2024-12-08 RX ORDER — PREDNISONE 10 MG/1
TABLET ORAL
Qty: 50 TABLET | Refills: 0 | Status: SHIPPED | OUTPATIENT
Start: 2024-12-08 | End: 2024-12-21

## 2024-12-08 RX ORDER — IPRATROPIUM BROMIDE AND ALBUTEROL SULFATE 2.5; .5 MG/3ML; MG/3ML
3 SOLUTION RESPIRATORY (INHALATION) ONCE
Status: COMPLETED | OUTPATIENT
Start: 2024-12-08 | End: 2024-12-08

## 2024-12-08 NOTE — ED PROVIDER NOTES
Patient Seen in: [facility]    History     Chief Complaint   Patient presents with    Shortness Of Breath     Stated Complaint: Asthma - Want to get double checked. I was there on friday    HPI    Abigail Puga is a 44 year old female presents with chief complaint of cough.  Onset 3 days ago.  Patient reports associated dyspnea and intermittent wheeze.  Patient states she was seen at this facility 2 days ago and symptoms persist.  Patient reports history of asthma and states her symptoms are similar to previous acute exacerbations.  Patient denies fever, chills, earache, nasal drainage, sore throat, abdominal pain, nausea, vomiting, diarrhea, constipation, dysuria, hematuria, flank pain, rash, neck pain, neck swelling, restricted neck movement, hemoptysis, chest pain, extremity pain, extremity swelling.      Past Medical History:    Abnormal Pap smear of cervix    BRCA gene mutation negative in female    Negative 54 gene hereditary cancer panel, report in media tab    Endometriosis    Hypothyroidism    no meds       Past Surgical History:   Procedure Laterality Date          LTCS for FTP    Colposcopy, cervix w/upper adjacent vagina; w/biopsy(s), cervix N/A     Laparoscopy,pelvic,biopsy  2016    for endmetriois    Needle biopsy left Left 2021    Needle biopsy right  2022    us guided            Family History   Problem Relation Age of Onset    Pancreatic Cancer Mother 55         at 56y    Breast Cancer Other         dx >50y    Melanoma Other         dx >50y    Cancer Other         esophageal cancer, dx >50y    Schizophrenia Father     Stroke Maternal Grandmother         ?    Schizophrenia Paternal Uncle     Diabetes Neg     Glaucoma Neg     Macular degeneration Neg        Social History     Socioeconomic History    Marital status:    Tobacco Use    Smoking status: Former     Current packs/day: 0.00     Types: Cigarettes     Quit date:      Years since  quittin.9    Smokeless tobacco: Never   Vaping Use    Vaping status: Never Used   Substance and Sexual Activity    Alcohol use: Yes     Comment: Social use    Drug use: Never    Sexual activity: Yes     Partners: Male     Birth control/protection: Vasectomy     Social Drivers of Health     Financial Resource Strain: Low Risk  (2023)    Received from [facility]    Overall Financial Resource Strain (CARDIA)     Difficulty of Paying Living Expenses: Not very hard   Food Insecurity: No Food Insecurity (2023)    Received from [facility]    Hunger Vital Sign     Worried About Running Out of Food in the Last Year: Never true     Ran Out of Food in the Last Year: Never true   Transportation Needs: No Transportation Needs (2023)    Received from [facility]    PRAPARE - Transportation     Lack of Transportation (Medical): No     Lack of Transportation (Non-Medical): No   Physical Activity: Insufficiently Active (2023)    Received from [facility]    Exercise Vital Sign     Days of Exercise per Week: 5 days     Minutes of Exercise per Session: 20 min   Housing Stability: Low Risk  (2023)    Received from [facility]    Housing Stability     What is your living situation today?: I have a steady place to live       Review of Systems    Positive for stated complaint: Asthma - Want to get double checked. I was there on friday  Other systems are as noted in HPI.  Constitutional and vital signs reviewed.      All other systems reviewed and negative except as noted above.    PSFH elements reviewed from today and agreed except as otherwise stated in HPI.    Physical Exam     ED Triage Vitals [24 1134]   /68   Pulse 65   Resp 18   Temp 98.3 °F (36.8 °C)   Temp src Oral   SpO2 96 %   O2 Device None (Room air)       Current:/68   Pulse 65   Temp 98.3 °F (36.8 °C) (Oral)   Resp 18   LMP 2024   SpO2 96%      PULSE OX within normal limits on room air as interpreted by this provider.     Constitutional: The patient is cooperative. Appears well-developed and well-nourished.  No acute distress.  Psychological: Alert, No abnormalities of mood, affect.  Head: Normocephalic/atraumatic.   Eyes: Pupils are equal round reactive to light.  Conjunctiva are within normal limits.  ENT: Pharynx noninjected.  Tonsils within normal size limits bilaterally.  No tonsillar exudates.  TMs within normal limits bilaterally.  Mucous membranes moist.  Neck: The neck is supple.  No meningeal signs.  Chest: There is no tenderness to the chest wall.  No CVA tenderness bilaterally.  Respiratory: Respiratory effort was normal.  Positive expiratory wheeze right lung.  There is no stridor.  Air entry is equal.  No retractions.  Cardiovascular: Regular rate and rhythm.  Capillary refill is brisk.    Lymphatic: No gross lymphadenopathy noted.  Musculoskeletal: Musculoskeletal system is grossly intact.  There is no obvious deformity.  Neurological: Gross motor movement is intact in all 4 extremities.  Patient exhibits normal speech.  Skin: Skin is normal to inspection.  Warm and dry.  No obvious bruising.  No obvious rash.        ED Course   Labs Reviewed - No data to display    MDM     EMR encounter on 12/6/2024 reviewed.    Differential diagnosis including but not limited to URI, bronchitis, pneumonia, acute asthma, ACS, PE    Patient declined further workup including but not limited to EKG, blood work, further imaging.    Radiology findings: XR CHEST PA + LAT CHEST (CPT=71046)    Result Date: 12/8/2024  CONCLUSION:   No focal opacity, pleural effusion, or pneumothorax.    Dictated by (CST): Farhad Silva MD on 12/08/2024 at 12:25 PM     Finalized by (CST): Farhad Silva MD on 12/08/2024 at 12:26 PM           Chest x-ray images independently reviewed by this provider-no pneumonia.     Lungs clear to auscultation bilaterally without wheeze,  rales or rhonchi prior to discharge.  Remainder of physical exam remained stable over serial reexaminations as previously documented.  Results reviewed with patient.  Will increase oral prednisone to 60 mg a day with taper.    I have given the patient instructions regarding their diagnoses, expectations, follow up, and ER precautions. I explained to the patient that emergent conditions may arise and to go to the ER for new, worsening or any persistent conditions. I've explained the importance of following up with their doctor as instructed. The patient verbalized understanding of the discharge instructions and plan.      Disposition and Plan     Clinical Impression:  1. Acute asthma (HCC)    2. Acute cough    3. Dyspnea, unspecified type        Disposition:  Discharge    Follow-up:  Surinder Walter MD  [address] [place] [address] [id number] [phone]    Call in 1 day  For follow-up      Medications Prescribed:  Discharge Medication List as of 12/8/2024 12:33 PM        START taking these medications    Details   !! predniSONE 10 MG Oral Tab Take 6 tablets (60 mg total) by mouth daily for 5 days, THEN 4 tablets (40 mg total) daily for 2 days, THEN 3 tablets (30 mg total) daily for 2 days, THEN 2 tablets (20 mg total) daily for 2 days, THEN 1 tablet (10 mg total) daily for 2 days., Normal, Di sp-50 tablet, R-0       !! - Potential duplicate medications found. Please discuss with provider.

## 2024-12-09 ENCOUNTER — PATIENT MESSAGE (OUTPATIENT)
Dept: FAMILY MEDICINE CLINIC | Facility: CLINIC | Age: 44
End: 2024-12-09

## 2024-12-09 ENCOUNTER — OFFICE VISIT (OUTPATIENT)
Dept: FAMILY MEDICINE CLINIC | Facility: CLINIC | Age: 44
End: 2024-12-09
Payer: COMMERCIAL

## 2024-12-09 VITALS
HEART RATE: 78 BPM | BODY MASS INDEX: 28 KG/M2 | WEIGHT: 165 LBS | DIASTOLIC BLOOD PRESSURE: 56 MMHG | TEMPERATURE: 99 F | SYSTOLIC BLOOD PRESSURE: 124 MMHG

## 2024-12-09 DIAGNOSIS — J45.21 MILD INTERMITTENT ASTHMA WITH ACUTE EXACERBATION (HCC): Primary | ICD-10-CM

## 2024-12-09 DIAGNOSIS — R06.02 SHORTNESS OF BREATH: ICD-10-CM

## 2024-12-09 DIAGNOSIS — R06.2 WHEEZING: ICD-10-CM

## 2024-12-09 PROBLEM — D35.2 PITUITARY MICROADENOMA (HCC): Status: ACTIVE | Noted: 2024-12-09

## 2024-12-09 RX ORDER — AZITHROMYCIN 250 MG/1
TABLET, FILM COATED ORAL
Qty: 6 TABLET | Refills: 0 | Status: SHIPPED | OUTPATIENT
Start: 2024-12-09 | End: 2024-12-14

## 2024-12-09 RX ORDER — BUDESONIDE AND FORMOTEROL FUMARATE DIHYDRATE 80; 4.5 UG/1; UG/1
2 AEROSOL RESPIRATORY (INHALATION) 2 TIMES DAILY
Qty: 1 EACH | Refills: 0 | Status: SHIPPED | OUTPATIENT
Start: 2024-12-09

## 2024-12-09 NOTE — PROGRESS NOTES
Chief Complaint:   Chief Complaint   Patient presents with    ER F/U     ICC for cough, SOB, started last wednesday    Tingling     Hands and feet, full body since yesterday       HPI:   This is a 44 year old female coming in for IC follow-up for asthma exacerbation. She has been ill since , had multiple family members with strep. Saw a telehealth provider on  and was prescribed amoxicillin in case she had strep. Went to IC on  and again  with cough, chest pain, and shortness of breath. Chest x-rays and COVID negative. They prescribed prednisone 40mg daily initially, then changed to 60mg daily with a taper. She has not picked up the higher dose of steroid yet. Using her albuterol inhaler every 4-6 hours, has a nebulizer waiting at the pharmacy as well. Has been taking Mucinex. She reports her cough has gotten better but still coughs at times, productive of yellow sputum. Dyspnea is still present but better. Chest pain, congestion, sore throat have resolved. No fevers. Was getting chills at onset of illness but that resolved. Denies body aches or GI symptoms. Eating and drinking normally. Notes she has intermittent tingling of hands/feet x years, has seen neurology for this, not a new symptom.     Results for orders placed or performed during the hospital encounter of 24   Rapid SARS-CoV-2 by PCR    Collection Time: 24  1:08 PM    Specimen: Nares; Other   Result Value Ref Range    Rapid SARS-CoV-2 by PCR Not Detected Not Detected             Past Medical History:    Abnormal Pap smear of cervix    BRCA gene mutation negative in female    Negative 54 gene hereditary cancer panel, report in media tab    Endometriosis    Hypothyroidism    no meds     Past Surgical History:   Procedure Laterality Date          LTCS for FTP    Colposcopy, cervix w/upper adjacent vagina; w/biopsy(s), cervix N/A     Laparoscopy,pelvic,biopsy  2016    for endmetriois    Needle biopsy left Left  2021    Needle biopsy right  2022    us guided     Social History:  Social History     Socioeconomic History    Marital status:    Tobacco Use    Smoking status: Former     Current packs/day: 0.00     Types: Cigarettes     Quit date:      Years since quittin.9     Passive exposure: Past    Smokeless tobacco: Never   Vaping Use    Vaping status: Never Used   Substance and Sexual Activity    Alcohol use: Yes     Comment: Social use    Drug use: Never    Sexual activity: Yes     Partners: Male     Birth control/protection: Vasectomy     Social Drivers of Health     Financial Resource Strain: Low Risk  (2023)    Received from [facility]    Overall Financial Resource Strain (CARDIA)     Difficulty of Paying Living Expenses: Not very hard   Food Insecurity: No Food Insecurity (2023)    Received from [facility]    Hunger Vital Sign     Worried About Running Out of Food in the Last Year: Never true     Ran Out of Food in the Last Year: Never true   Transportation Needs: No Transportation Needs (2023)    Received from [facility]    PRAPARE - Transportation     Lack of Transportation (Medical): No     Lack of Transportation (Non-Medical): No   Physical Activity: Insufficiently Active (2023)    Received from [facility]    Exercise Vital Sign     Days of Exercise per Week: 5 days     Minutes of Exercise per Session: 20 min   Housing Stability: Low Risk  (2023)    Received from [facility]    Housing Stability     What is your living situation today?: I have a steady place to live     Family History:  Family History   Problem Relation Age of Onset    Pancreatic Cancer Mother 55         at 56y    Breast Cancer Other         dx >50y    Melanoma Other         dx >50y    Cancer Other         esophageal cancer, dx >50y    Schizophrenia Father     Stroke Maternal Grandmother         ?    Schizophrenia  Paternal Uncle     Diabetes Neg     Glaucoma Neg     Macular degeneration Neg      Allergies:  Allergies[1]  Current Meds:  Current Outpatient Medications   Medication Sig Dispense Refill    azithromycin 250 MG Oral Tab Take 2 tablets (500 mg total) by mouth daily for 1 day, THEN 1 tablet (250 mg total) daily for 4 days. 6 tablet 0    Budesonide-Formoterol Fumarate 80-4.5 MCG/ACT Inhalation Aerosol Inhale 2 puffs into the lungs 2 (two) times daily. 1 each 0    ipratropium-albuterol 0.5-2.5 (3) MG/3ML Inhalation Solution Take 3 mL by nebulization every 6 (six) hours as needed. 30 each 0    amoxicillin 500 MG Oral Cap Take 1 capsule (500 mg total) by mouth 2 (two) times daily.      albuterol 108 (90 Base) MCG/ACT Inhalation Aero Soln Inhale 2 puffs into the lungs every 4 (four) hours as needed for Wheezing. 1 each 0    FLUoxetine 10 MG Oral Cap       Multiple Vitamin (MULTIVITAMIN ADULT OR) Take by mouth As Directed.      albuterol 108 (90 Base) MCG/ACT Inhalation Aero Soln Inhale 2 puffs into the lungs every 6 (six) hours as needed for Wheezing. 8.5 g 3    COLLAGEN OR Take by mouth.      predniSONE 10 MG Oral Tab Take 6 tablets (60 mg total) by mouth daily for 5 days, THEN 4 tablets (40 mg total) daily for 2 days, THEN 3 tablets (30 mg total) daily for 2 days, THEN 2 tablets (20 mg total) daily for 2 days, THEN 1 tablet (10 mg total) daily for 2 days. 50 tablet 0    benzonatate 100 MG Oral Cap Take 1 capsule (100 mg total) by mouth 3 (three) times daily as needed for cough. (Patient not taking: Reported on 12/9/2024) 30 capsule 0      Counseling given: Not Answered       REVIEW OF SYSTEMS:   CONSTITUTIONAL:  Denies unusual weight gain/loss, fever. +Chills, resolved now. +Fatigue.  HEENT:  Had some congestion and sore throat, now resolved.  INTEGUMENTARY:  Denies rashes, skin lesion.  CARDIOVASCULAR: See HPI.  RESPIRATORY:  See HPI.  GASTROINTESTINAL:  Denies abdominal pain, nausea, vomiting, constipation, diarrhea,  or blood in stool.  GENITOURINARY: Denies dysuria, hematuria, frequency.  MUSCULOSKELETAL:  Denies body aches.  NEUROLOGICAL:  Denies headache.    EXAM:   /56 (BP Location: Left arm, Patient Position: Sitting, Cuff Size: adult)   Pulse 78   Temp 99 °F (37.2 °C) (Oral)   Wt 165 lb (74.8 kg)   LMP 11/27/2024   BMI 28.32 kg/m²  Estimated body mass index is 28.32 kg/m² as calculated from the following:    Height as of 8/19/24: 5' 4\" (1.626 m).    Weight as of this encounter: 165 lb (74.8 kg).   Vital signs reviewed.Appears stated age, well groomed, in no acute distress.  Physical Exam:  GEN:  Patient is alert, awake and oriented, well developed, well nourished.  HEENT:  Head:  Normocephalic, atraumatic Eyes: EOMI, PERRLA, conjunctivae clear bilaterally, no eye discharge Ears: External normal, TM clear bilaterally. Nose: patent, no nasal discharge. Throat:  No tonsillar erythema or exudate.  Mouth:  No oral lesions, good dentition.  NECK: Supple, no CLAD, no thyromegaly.  SKIN: No rashes, no skin lesion, no bruising, good turgor.  HEART:  Regular rate and rhythm, no murmurs, rubs or gallops.  LUNGS: Expiratory wheezing throughout lung fields.  NEURO:  No deficit, normal gait, strength and tone, sensory intact.    ASSESSMENT AND PLAN:   1. Mild intermittent asthma with acute exacerbation (HCC)  -Still wheezing despite steroids and albuterol. Recommended to start higher dose of oral steroids (prednisone 60mg) at pharmacy as prescribed by IC, with longer taper.  -Will trial azithromycin as directed below. Discussed how to take this and possible side effects.  -Will trial Symbicort BID. Discussed how to use and possible side effects. Discussed that this is not a rescue inhaler.  -May use albuterol nebulizer or inhaler PRN as prescribed.  -To ER with fever, shaking chills, chest pain, worsening dyspnea.   -See me in-person 7-10 days.   - azithromycin 250 MG Oral Tab; Take 2 tablets (500 mg total) by mouth daily for  1 day, THEN 1 tablet (250 mg total) daily for 4 days.  Dispense: 6 tablet; Refill: 0  - Budesonide-Formoterol Fumarate 80-4.5 MCG/ACT Inhalation Aerosol; Inhale 2 puffs into the lungs 2 (two) times daily.  Dispense: 1 each; Refill: 0    2. Shortness of breath  -See above.  - Budesonide-Formoterol Fumarate 80-4.5 MCG/ACT Inhalation Aerosol; Inhale 2 puffs into the lungs 2 (two) times daily.  Dispense: 1 each; Refill: 0    3. Wheezing  -See above.  - Budesonide-Formoterol Fumarate 80-4.5 MCG/ACT Inhalation Aerosol; Inhale 2 puffs into the lungs 2 (two) times daily.  Dispense: 1 each; Refill: 0      Meds & Refills for this Visit:  Requested Prescriptions     Signed Prescriptions Disp Refills    azithromycin 250 MG Oral Tab 6 tablet 0     Sig: Take 2 tablets (500 mg total) by mouth daily for 1 day, THEN 1 tablet (250 mg total) daily for 4 days.    Budesonide-Formoterol Fumarate 80-4.5 MCG/ACT Inhalation Aerosol 1 each 0     Sig: Inhale 2 puffs into the lungs 2 (two) times daily.         Problem List:  Patient Active Problem List   Diagnosis    Hypothyroidism    Pituitary adenoma (HCC)    Myopia of both eyes with regular astigmatism    Hair loss    Spells of decreased attentiveness    Parasomnia    BRCA gene mutation negative in female    Irregular menstrual cycle    Perimenopausal symptoms    PVC's (premature ventricular contractions)    Eosinophilic esophagitis    Pituitary microadenoma (HCC)       Jolene Mccall, GANESH  12/9/2024  2:53 PM         [1] No Known Allergies

## 2024-12-11 ENCOUNTER — OFFICE VISIT (OUTPATIENT)
Dept: FAMILY MEDICINE CLINIC | Facility: CLINIC | Age: 44
End: 2024-12-11
Payer: COMMERCIAL

## 2024-12-11 VITALS
OXYGEN SATURATION: 94 % | BODY MASS INDEX: 29 KG/M2 | DIASTOLIC BLOOD PRESSURE: 56 MMHG | HEART RATE: 99 BPM | TEMPERATURE: 99 F | SYSTOLIC BLOOD PRESSURE: 120 MMHG | WEIGHT: 166.38 LBS

## 2024-12-11 DIAGNOSIS — R06.02 SOB (SHORTNESS OF BREATH): ICD-10-CM

## 2024-12-11 DIAGNOSIS — J45.31 MILD PERSISTENT ASTHMA WITH (ACUTE) EXACERBATION (HCC): Primary | ICD-10-CM

## 2024-12-11 PROCEDURE — 99214 OFFICE O/P EST MOD 30 MIN: CPT | Performed by: STUDENT IN AN ORGANIZED HEALTH CARE EDUCATION/TRAINING PROGRAM

## 2024-12-11 NOTE — PROGRESS NOTES
Subjective:   Abigail Puga is a 44 year old female who presents for Follow - Up (still having SOB symptoms)     44-year-old female coming in to follow-up in regards to SOB.  Patient felt sick around 11/27/2024.  Given multiple family members with strep, she saw a telehealth provider on 12/2/24 and was prescribed amoxicillin.  Went to immediate care on 12/6/2024 and 12/8/2024 with cough, chest pain and SOB.  At that time chest x-ray was within normal limits and COVID testing was negative.  She was prescribed prednisone 40 mg daily then changed to 60 mg daily with a taper.  Did not immediately start the higher dose of prednisone.  Using albuterol every 4-6 hours.  At last office visit (12/9/2024) she was advised to use the higher dose of steroid (prednisone 60 mg) and was prescribed azithromycin and Symbicort.  She started all those medications the same day of the office visit.  Still occasionally feels as if she is wheezing.  Has been using her kids nebulizer at home with last use today.  States would get some relief from nebulizer use however does not want to exacerbate palpitations with it.  Mentions last night felt as if she was getting better however back to how she felt previously today.  No recent travels.  Last went to [place] 1 month ago.  Denies chest pain, immobilization for more than 3 days, hospitalization, recent surgery, use of hormonal therapy, smoking, fever, chills, leg swelling.    History/Other:    Chief Complaint Reviewed and Verified  Nursing Notes Reviewed and   Verified  Tobacco Reviewed  Allergies Reviewed  Medications Reviewed    Problem List Reviewed  Medical History Reviewed  Surgical History   Reviewed  OB Status Reviewed  Family History Reviewed         Tobacco:  She smoked tobacco in the past but quit greater than 12 months ago.  Social History     Tobacco Use   Smoking Status Former    Current packs/day: 0.00    Types: Cigarettes    Quit date: 2006    Years since  quittin.9    Passive exposure: Past   Smokeless Tobacco Never        Current Outpatient Medications   Medication Sig Dispense Refill    azithromycin 250 MG Oral Tab Take 2 tablets (500 mg total) by mouth daily for 1 day, THEN 1 tablet (250 mg total) daily for 4 days. 6 tablet 0    Budesonide-Formoterol Fumarate 80-4.5 MCG/ACT Inhalation Aerosol Inhale 2 puffs into the lungs 2 (two) times daily. 1 each 0    predniSONE 10 MG Oral Tab Take 6 tablets (60 mg total) by mouth daily for 5 days, THEN 4 tablets (40 mg total) daily for 2 days, THEN 3 tablets (30 mg total) daily for 2 days, THEN 2 tablets (20 mg total) daily for 2 days, THEN 1 tablet (10 mg total) daily for 2 days. 50 tablet 0    ipratropium-albuterol 0.5-2.5 (3) MG/3ML Inhalation Solution Take 3 mL by nebulization every 6 (six) hours as needed. 30 each 0    amoxicillin 500 MG Oral Cap Take 1 capsule (500 mg total) by mouth 2 (two) times daily.      albuterol 108 (90 Base) MCG/ACT Inhalation Aero Soln Inhale 2 puffs into the lungs every 4 (four) hours as needed for Wheezing. 1 each 0    FLUoxetine 10 MG Oral Cap       Multiple Vitamin (MULTIVITAMIN ADULT OR) Take by mouth As Directed.      albuterol 108 (90 Base) MCG/ACT Inhalation Aero Soln Inhale 2 puffs into the lungs every 6 (six) hours as needed for Wheezing. 8.5 g 3    COLLAGEN OR Take by mouth.      benzonatate 100 MG Oral Cap Take 1 capsule (100 mg total) by mouth 3 (three) times daily as needed for cough. (Patient not taking: Reported on 2024) 30 capsule 0         Review of Systems:  Review of Systems   Constitutional:  Negative for chills, diaphoresis and fever.   HENT:  Negative for congestion, ear discharge, ear pain, sinus pressure, sinus pain and sore throat.    Eyes:  Negative for pain and discharge.   Respiratory:  Positive for cough and shortness of breath. Negative for chest tightness and wheezing.    Cardiovascular:  Negative for chest pain and palpitations.   Gastrointestinal:   Negative for abdominal pain, diarrhea, nausea and vomiting.   Endocrine: Negative for cold intolerance and heat intolerance.   Genitourinary:  Negative for dysuria, flank pain, frequency and urgency.   Musculoskeletal:  Negative for joint swelling.   Skin:  Negative for rash.   Neurological:  Negative for dizziness, syncope and headaches.   Psychiatric/Behavioral:  Negative for confusion and hallucinations.        Objective:   /56 (BP Location: Left arm, Patient Position: Sitting, Cuff Size: adult)   Pulse 99   Temp 98.8 °F (37.1 °C) (Oral)   Wt 166 lb 6.4 oz (75.5 kg)   LMP 11/27/2024   SpO2 94%   BMI 28.56 kg/m²  Estimated body mass index is 28.56 kg/m² as calculated from the following:    Height as of 8/19/24: 5' 4\" (1.626 m).    Weight as of this encounter: 166 lb 6.4 oz (75.5 kg).  Physical Exam  Constitutional:       General: She is not in acute distress.     Appearance: Normal appearance. She is not ill-appearing or toxic-appearing.   HENT:      Head: Normocephalic and atraumatic.   Cardiovascular:      Rate and Rhythm: Normal rate and regular rhythm.      Heart sounds: Normal heart sounds. No murmur heard.     No gallop.      Comments: Some premature complexes noted.  Pulmonary:      Effort: Pulmonary effort is normal. No respiratory distress.      Breath sounds: Normal breath sounds. No stridor. No wheezing, rhonchi or rales.      Comments: Mild crackle and wheezing in right lower lung.  Minimal crackle and wheezing in left lower lung  Abdominal:      General: Bowel sounds are normal.      Palpations: Abdomen is soft.      Tenderness: There is no abdominal tenderness. There is no guarding.   Musculoskeletal:      Cervical back: Normal range of motion and neck supple. No rigidity or tenderness.      Right lower leg: No edema.      Left lower leg: No edema.      Comments: No calf tenderness and negative Homans bilaterally.   Skin:     General: Skin is warm and dry.   Neurological:      General: No  focal deficit present.      Mental Status: She is alert and oriented to person, place, and time. Mental status is at baseline.   Psychiatric:         Mood and Affect: Mood normal.         Behavior: Behavior normal.         Thought Content: Thought content normal.         Judgment: Judgment normal.       XR CHEST PA + LAT CHEST (CPT=71046)    Result Date: 12/8/2024  CONCLUSION:   No focal opacity, pleural effusion, or pneumothorax.    Dictated by (CST): Farhad Silva MD on 12/08/2024 at 12:25 PM     Finalized by (CST): Farhad Silva MD on 12/08/2024 at 12:26 PM          XR CHEST PA + LAT CHEST (CPT=71046)    Result Date: 12/6/2024  CONCLUSION:  1. No acute disease in the chest.    Dictated by (CST): Yuri Fox MD on 12/06/2024 at 1:41 PM     Finalized by (CST): Yuri Fox MD on 12/06/2024 at 1:41 PM           Admission on 12/06/2024, Discharged on 12/06/2024   Component Date Value Ref Range Status    Rapid SARS-CoV-2 by PCR 12/06/2024 Not Detected  Not Detected Final    This test is intended for the qualitative detection of nucleic acid from the SARS-CoV-2 viral RNA from individuals who are suspected of COVID-19 infection by their healthcare provider.    A \"Detected\" result is considered a positive test result for COVID-19.   A \"Not Detected\" result for this test means that SARS-CoV-2 RNA was not present in the sample above the limit of detection of the assay.    Test performed using the Abbott ID NOW COVID-19 assay performed on the ID NOW Instrument, [employer] [place], [place] [address].    This test is being used under the Food and Drug Administration's Emergency Use Authorization.    The authorized Fact Sheet for Healthcare Providers for this assay is available upon request from the laboratory.    [address] [address]   Phone: [phone]  Fax: [phone]  [facility] # [id number]        Assessment & Plan:    1. Mild persistent asthma with (acute) exacerbation (HCC) (Primary)  Patient completed a course of amoxicillin for strep exposure and currently on azithromycin.  If there is a concern for pneumonia, above antibiotics would have been appropriate coverage except for the dose of amoxicillin.    -Complete course of azithromycin and if no improvement repeat chest x-ray.  -Continue with prednisone.  -Continue with albuterol and Symbicort  -ED precautions discussed  -     XR CHEST PA + LAT CHEST (CPT=71046); Future; Expected date: 12/11/2024  2. SOB (shortness of breath)  Patient walked up and down the hallway x 3 with O2 saturation at 94%, 94% and 92% respectively.  No additional SOB with exertion.  -     XR CHEST PA + LAT CHEST (CPT=71046); Future; Expected date: 12/11/2024          Return in about 2 weeks (around 12/25/2024).    Surinder Walter MD, 12/11/2024, 1:36 PM

## 2024-12-13 ENCOUNTER — NURSE TRIAGE (OUTPATIENT)
Dept: FAMILY MEDICINE CLINIC | Facility: CLINIC | Age: 44
End: 2024-12-13

## 2024-12-13 ENCOUNTER — HOSPITAL ENCOUNTER (EMERGENCY)
Facility: HOSPITAL | Age: 44
Discharge: HOME | End: 2024-12-13
Attending: EMERGENCY MEDICINE
Payer: COMMERCIAL

## 2024-12-13 ENCOUNTER — HOSPITAL ENCOUNTER (OUTPATIENT)
Dept: CT IMAGING | Facility: HOSPITAL | Age: 44
Discharge: HOME | End: 2024-12-13
Attending: STUDENT IN AN ORGANIZED HEALTH CARE EDUCATION/TRAINING PROGRAM
Payer: COMMERCIAL

## 2024-12-13 ENCOUNTER — OFFICE VISIT (OUTPATIENT)
Dept: FAMILY MEDICINE CLINIC | Facility: CLINIC | Age: 44
End: 2024-12-13
Payer: COMMERCIAL

## 2024-12-13 VITALS
HEIGHT: 64 IN | OXYGEN SATURATION: 100 % | HEART RATE: 53 BPM | BODY MASS INDEX: 27.83 KG/M2 | RESPIRATION RATE: 20 BRPM | WEIGHT: 163 LBS

## 2024-12-13 VITALS
DIASTOLIC BLOOD PRESSURE: 54 MMHG | OXYGEN SATURATION: 97 % | WEIGHT: 164.19 LBS | HEART RATE: 84 BPM | BODY MASS INDEX: 28 KG/M2 | SYSTOLIC BLOOD PRESSURE: 118 MMHG | TEMPERATURE: 99 F

## 2024-12-13 DIAGNOSIS — T50.8X1A: Primary | ICD-10-CM

## 2024-12-13 DIAGNOSIS — J45.31 MILD PERSISTENT ASTHMA WITH (ACUTE) EXACERBATION (HCC): ICD-10-CM

## 2024-12-13 DIAGNOSIS — R06.02 SOB (SHORTNESS OF BREATH): ICD-10-CM

## 2024-12-13 DIAGNOSIS — R06.02 SOB (SHORTNESS OF BREATH): Primary | ICD-10-CM

## 2024-12-13 DIAGNOSIS — I49.3 MULTIPLE PREMATURE VENTRICULAR COMPLEXES: ICD-10-CM

## 2024-12-13 LAB
ATRIAL RATE: 71 BPM
CREAT BLD-MCNC: 0.8 MG/DL
EGFRCR SERPLBLD CKD-EPI 2021: 94 ML/MIN/1.73M2 (ref 60–?)
P AXIS: 65 DEGREES
P-R INTERVAL: 146 MS
Q-T INTERVAL: 380 MS
QRS DURATION: 74 MS
QTC CALCULATION (BEZET): 412 MS
R AXIS: 46 DEGREES
T AXIS: 52 DEGREES
VENTRICULAR RATE: 71 BPM

## 2024-12-13 PROCEDURE — 96372 THER/PROPH/DIAG INJ SC/IM: CPT

## 2024-12-13 PROCEDURE — S0028 INJECTION, FAMOTIDINE, 20 MG: HCPCS

## 2024-12-13 PROCEDURE — 99284 EMERGENCY DEPT VISIT MOD MDM: CPT

## 2024-12-13 PROCEDURE — 71260 CT THORAX DX C+: CPT | Performed by: STUDENT IN AN ORGANIZED HEALTH CARE EDUCATION/TRAINING PROGRAM

## 2024-12-13 PROCEDURE — 99215 OFFICE O/P EST HI 40 MIN: CPT | Performed by: STUDENT IN AN ORGANIZED HEALTH CARE EDUCATION/TRAINING PROGRAM

## 2024-12-13 PROCEDURE — 82565 ASSAY OF CREATININE: CPT

## 2024-12-13 PROCEDURE — 96374 THER/PROPH/DIAG INJ IV PUSH: CPT

## 2024-12-13 PROCEDURE — 99285 EMERGENCY DEPT VISIT HI MDM: CPT

## 2024-12-13 PROCEDURE — 93000 ELECTROCARDIOGRAM COMPLETE: CPT | Performed by: STUDENT IN AN ORGANIZED HEALTH CARE EDUCATION/TRAINING PROGRAM

## 2024-12-13 RX ORDER — FAMOTIDINE 10 MG/ML
INJECTION, SOLUTION INTRAVENOUS
Status: DISCONTINUED
Start: 2024-12-13 | End: 2024-12-13

## 2024-12-13 RX ORDER — METHYLPREDNISOLONE SODIUM SUCCINATE 125 MG/2ML
INJECTION INTRAMUSCULAR; INTRAVENOUS
Status: DISCONTINUED
Start: 2024-12-13 | End: 2024-12-13

## 2024-12-13 RX ORDER — DIPHENHYDRAMINE HYDROCHLORIDE 50 MG/ML
50 INJECTION INTRAMUSCULAR; INTRAVENOUS ONCE
Status: COMPLETED | OUTPATIENT
Start: 2024-12-13 | End: 2024-12-13

## 2024-12-13 RX ORDER — DIPHENHYDRAMINE HYDROCHLORIDE 50 MG/ML
INJECTION INTRAMUSCULAR; INTRAVENOUS
Status: COMPLETED
Start: 2024-12-13 | End: 2024-12-13

## 2024-12-13 NOTE — PROGRESS NOTES
Subjective:   Abigail Puga is a 44 year old female who presents for Cough (With SOB light headed, shaky and anxious), Leg Pain (R upper thigh pain throbbing, ), and Diarrhea (Started yesterday, painful today with chills)     44-year-old coming in for follow-up in regards to SOB.  As noted in last office visit, felt sick around 11/27/2024.  Given multiple family members with strep, she saw a telehealth provider on 12/2/24 and was prescribed amoxicillin.  Went to immediate care on 12/6/2024 and 12/8/2024 with cough, chest pain and SOB.  At that time chest x-ray was within normal limits and COVID testing was negative.  She was prescribed prednisone 40 mg daily then changed to 60 mg daily with a taper.  Did not immediately start the higher dose of prednisone.  Using albuterol every 4-6 hours.  At last office visit (12/9/2024) she was advised to use the higher dose of steroid (prednisone 60 mg) and was prescribed azithromycin and Symbicort.  She started all those medications the same day of the office visit.  Still occasionally feels as if she is wheezing.  Has been using her kids nebulizer at home with last use today.  States would get some relief from nebulizer use however does not want to exacerbate palpitations with it.  Already completed course of azithromycin.    At this time continues to have on and off episodes of SOB that is also notable at rest with no exertion. Denies chest pain, immobilization for more than 3 days, hospitalization, recent surgery, use of hormonal therapy, smoking, fever, chills, leg swelling.    This morning started noticing a mild throbbing right thigh pain.  She had no injury to the area and has not been working out recently.  Has been checking her O2 saturation at home with measurements between 94 and 97%.    History/Other:    Chief Complaint Reviewed and Verified  Nursing Notes Reviewed and   Verified  Tobacco Reviewed  Allergies Reviewed  Medications Reviewed    Problem List  Reviewed  Medical History Reviewed  Surgical History   Reviewed  OB Status Reviewed  Family History Reviewed         Tobacco:  She smoked tobacco in the past but quit greater than 12 months ago.  Social History     Tobacco Use   Smoking Status Former    Current packs/day: 0.00    Types: Cigarettes    Quit date:     Years since quittin.9    Passive exposure: Past   Smokeless Tobacco Never        Current Outpatient Medications   Medication Sig Dispense Refill    predniSONE 10 MG Oral Tab Take 6 tablets (60 mg total) by mouth daily for 5 days, THEN 4 tablets (40 mg total) daily for 2 days, THEN 3 tablets (30 mg total) daily for 2 days, THEN 2 tablets (20 mg total) daily for 2 days, THEN 1 tablet (10 mg total) daily for 2 days. 50 tablet 0    ipratropium-albuterol 0.5-2.5 (3) MG/3ML Inhalation Solution Take 3 mL by nebulization every 6 (six) hours as needed. 30 each 0    albuterol 108 (90 Base) MCG/ACT Inhalation Aero Soln Inhale 2 puffs into the lungs every 4 (four) hours as needed for Wheezing. 1 each 0    FLUoxetine 10 MG Oral Cap       Multiple Vitamin (MULTIVITAMIN ADULT OR) Take by mouth As Directed.      albuterol 108 (90 Base) MCG/ACT Inhalation Aero Soln Inhale 2 puffs into the lungs every 6 (six) hours as needed for Wheezing. 8.5 g 3    COLLAGEN OR Take by mouth.      azithromycin 250 MG Oral Tab Take 2 tablets (500 mg total) by mouth daily for 1 day, THEN 1 tablet (250 mg total) daily for 4 days. (Patient not taking: Reported on 2024) 6 tablet 0    Budesonide-Formoterol Fumarate 80-4.5 MCG/ACT Inhalation Aerosol Inhale 2 puffs into the lungs 2 (two) times daily. (Patient not taking: Reported on 2024) 1 each 0    amoxicillin 500 MG Oral Cap Take 1 capsule (500 mg total) by mouth 2 (two) times daily. (Patient not taking: Reported on 2024)      benzonatate 100 MG Oral Cap Take 1 capsule (100 mg total) by mouth 3 (three) times daily as needed for cough. (Patient not taking:  Reported on 12/9/2024) 30 capsule 0         Review of Systems:  Review of Systems   Constitutional:  Negative for chills, diaphoresis and fever.   HENT:  Negative for congestion, ear discharge, ear pain, sinus pressure, sinus pain and sore throat.    Eyes:  Negative for pain and discharge.   Respiratory:  Positive for cough and shortness of breath. Negative for chest tightness and wheezing.    Cardiovascular:  Negative for chest pain and palpitations.   Gastrointestinal:  Negative for abdominal pain, diarrhea, nausea and vomiting.   Endocrine: Negative for cold intolerance and heat intolerance.   Genitourinary:  Negative for dysuria, flank pain, frequency and urgency.   Musculoskeletal:  Negative for joint swelling.   Skin:  Negative for rash.   Neurological:  Negative for dizziness, syncope and headaches.   Psychiatric/Behavioral:  Negative for confusion and hallucinations.        Objective:   /54 (BP Location: Left arm, Patient Position: Sitting, Cuff Size: adult)   Pulse 84   Temp 98.8 °F (37.1 °C) (Oral)   Wt 164 lb 3.2 oz (74.5 kg)   LMP 11/27/2024   SpO2 97%   BMI 28.18 kg/m²  Estimated body mass index is 28.18 kg/m² as calculated from the following:    Height as of 8/19/24: 5' 4\" (1.626 m).    Weight as of this encounter: 164 lb 3.2 oz (74.5 kg).  Physical Exam  Constitutional:       General: She is not in acute distress.     Appearance: Normal appearance. She is not ill-appearing or toxic-appearing.   HENT:      Head: Normocephalic and atraumatic.   Cardiovascular:      Rate and Rhythm: Normal rate and regular rhythm.      Heart sounds: Normal heart sounds. No murmur heard.     No gallop.   Pulmonary:      Effort: Pulmonary effort is normal. No respiratory distress.      Breath sounds: Normal breath sounds. No stridor. No wheezing, rhonchi or rales.      Comments: Mild crackle and wheezing in right lower lung.  Minimal crackle and wheezing in left lower lung.  Abdominal:      General: Bowel  sounds are normal.      Palpations: Abdomen is soft.      Tenderness: There is no abdominal tenderness. There is no right CVA tenderness, left CVA tenderness or guarding.   Musculoskeletal:         General: No swelling.      Cervical back: Normal range of motion and neck supple. No rigidity or tenderness.      Right upper leg: No swelling, edema, deformity, tenderness or bony tenderness.      Right knee: No swelling, deformity or bony tenderness. Normal range of motion. No tenderness.      Right lower leg: No edema.      Left lower leg: No edema.      Comments: Bilaterally no calf tenderness, negative Homans.  Bilateral feet: dorsalis pedis WNL.  Bilateral calfs equal in diameter.    Mild discomfort to palpation of right distal lateral thigh.   Skin:     General: Skin is warm and dry.   Neurological:      General: No focal deficit present.      Mental Status: She is alert and oriented to person, place, and time. Mental status is at baseline.   Psychiatric:         Mood and Affect: Mood normal.         Behavior: Behavior normal.         Thought Content: Thought content normal.         Judgment: Judgment normal.       Admission on 12/06/2024, Discharged on 12/06/2024   Component Date Value Ref Range Status    Rapid SARS-CoV-2 by PCR 12/06/2024 Not Detected  Not Detected Final    This test is intended for the qualitative detection of nucleic acid from the SARS-CoV-2 viral RNA from individuals who are suspected of COVID-19 infection by their healthcare provider.    A \"Detected\" result is considered a positive test result for COVID-19.   A \"Not Detected\" result for this test means that SARS-CoV-2 RNA was not present in the sample above the limit of detection of the assay.    Test performed using the Abbott ID NOW COVID-19 assay performed on the ID NOW Instrument, [employer] [place], [place] [address].    This test is being used under the Food and Drug Administration's Emergency Use  Authorization.    The authorized Fact Sheet for Healthcare Providers for this assay is available upon request from the laboratory.    [facility]   [address] [place] [address]   Phone: [phone]  Fax: [phone]  CLIA # [id number]      CT CHEST PE AORTA (IV ONLY) (CPT=71260)    Result Date: 12/13/2024  CONCLUSION:   1. No acute pulmonary embolism through the subsegmental pulmonary arteries. 2. No pneumonia, pleural effusion, or pneumothorax. 3. Small airways disease with mucous plugging involving a right lower lobe subsegmental bronchus. 4. Minimal atelectasis involving the lingula and right lower lobe.  5. Lesser incidental findings described above.    Dictated by (CST): Farhad Silva MD on 12/13/2024 at 2:57 PM     Finalized by (CST): Farhad Silva MD on 12/13/2024 at 3:01 PM          XR CHEST PA + LAT CHEST (CPT=71046)    Result Date: 12/8/2024  CONCLUSION:   No focal opacity, pleural effusion, or pneumothorax.    Dictated by (CST): Farhad Silva MD on 12/08/2024 at 12:25 PM     Finalized by (CST): Farhad Silva MD on 12/08/2024 at 12:26 PM          XR CHEST PA + LAT CHEST (CPT=71046)    Result Date: 12/6/2024  CONCLUSION:  1. No acute disease in the chest.    Dictated by (CST): Yuri Fox MD on 12/06/2024 at 1:41 PM     Finalized by (CST): Yuri Fox MD on 12/06/2024 at 1:41 PM             Assessment & Plan:   1. SOB (shortness of breath) (Primary)  Patient completed a course of amoxicillin for strep exposure and just finished a course of azithromycin.  If there is a concern for pneumonia, above antibiotics would have been appropriate coverage except for the dose of amoxicillin.   Wells' Criteria for Pulmonary Embolism: Low risk group.  Patient concerned about PE.  No acute PE on CT scan.  EKG in office sinus rhythm at 71 bpm with frequent PVCs.        -     ELECTROCARDIOGRAM, COMPLETE  -     CT CHEST PE AORTA (IV ONLY) (CPT=71260); Future;  Expected date: 12/13/2024  2. Mild persistent asthma with (acute) exacerbation (HCC)  -Continue with prednisone.  Decreased to 40 then 20 mg given frequent PVCs.  -Continue with albuterol and Symbicort  3. Multiple premature ventricular complexes  EKG in office sinus rhythm at 71 bpm with frequent PVCs.  -Already seen and evaluated by cardiology.        -     ELECTROCARDIOGRAM, COMPLETE        Return if symptoms worsen or fail to improve.    Surinder Walter MD, 12/13/2024, 12:54 PM           Time spent: 40 minutes, obtaining history, evaluating patient, discussing differential diagnosis, recommendations, diagnostic testing options, treatment options, risks and benefits of my recommendations, counseling patient, discussing prognosis/expectations, reviewing test results and follow up with patient as well as completing documentation.

## 2024-12-13 NOTE — ED INITIAL ASSESSMENT (HPI)
Pt arrived from CT d/t allergic reaction to the IV contrast. Post CT, pt started to get hives and having corinna. \"The back of my tongue and my throat are feeling tight\". Pt able to speak in full sentences.     Pt getting CT for sob, 1 dose of 60mg Prednisone taken this morning.

## 2024-12-13 NOTE — ED PROVIDER NOTES
Patient Seen in: [facility] Emergency Department      History     Chief Complaint   Patient presents with    Allergic Rxn Allergies     Stated Complaint: allergic reaction    Subjective:   HPI          Objective:     No pertinent past medical history.            No pertinent past surgical history.              No pertinent social history.                Physical Exam     ED Triage Vitals [12/13/24 1512]   BP    Pulse 53   Resp 20   Temp    Temp src    SpO2 100 %   O2 Device None (Room air)       Current Vitals:   Vital Signs  Pulse: 53  Resp: 20    Oxygen Therapy  SpO2: 100 %  O2 Device: None (Room air)        Physical Exam        ED Course   Labs Reviewed - No data to display                MDM      44-year-old female who has recently been experiencing shortness of breath for which her doctor has been looking into different etiologies presents today for an allergic reaction from her CT scan.  Recently, has been trialed on amoxicillin, prednisone, azithromycin, albuterol, and Symbicort.  None this morning.  Workup including chest x-ray and viral PCR so far negative.  Her doctor had ordered a CT to rule out PE today.  Minutes after getting the scan, she started experiencing some hives, itching, wheezing, and some subjective swelling in her throat.    On exam, vitals normal, nontoxic-appearing, urticaria noted on the patient's face and upper extremities, oropharynx clear without objective swelling, and expiratory wheezes bilaterally, abdomen benign    Differential: Allergic reaction to CT contrast, anaphylaxis    Patient given Benadryl and observed.  She had subjective worsening of symptoms.  IM Epinephrine added.    Patient was observed for over 2 hours in the ED.  She had complete of symptoms and was well-appearing asymptomatic on reexamination.    CT scan was reviewed which showed no clear etiology of the patient's shortness of breath or pulmonary embolism.    Patient discharged with instructions on close  PMD follow-up and with careful return precautions.        Medical Decision Making      Disposition and Plan     Clinical Impression:  1. Anaphylaxis due to contrast media         Disposition:  Discharge  12/13/2024  5:19 pm    Follow-up:  Surinder Walter MD  [address] [place] [id number]  [phone]    Schedule an appointment as soon as possible for a visit            Medications Prescribed:  Current Discharge Medication List              Supplementary Documentation:

## 2024-12-13 NOTE — TELEPHONE ENCOUNTER
RN s/w patient    Pt stated she's been sick for 2 weeks and still has shortness of breath with minimal exertion, wheezing and an occasional cough, all other cold symptoms have resolved. Pt's O2 sats are 94-96%.     Pt stated she has new symptoms, lightheadedness and right thigh has throbbing pain, pt rates her pain 4-5/10. Pt is concerned about possible blood clot.    Pt denies chest pain, difficulty breathing and fever.    Informed pt that message will be forwarded to Dr Walter for review and recommendations and will call pt back with dr Walter' recommendations. Pt verbalized understanding.    ED precautions given to pt. Pt verbalized understanding.      Reason for Disposition   Oxygen level (e.g., pulse oximetry) 91 to 94%    Protocols used: Breathing Difficulty-A-OH

## 2024-12-13 NOTE — IMAGING NOTE
1455 Patient to rad holding for allergic reaction post CT exam with iodinated contrast. Patient presents with itchy hives over face, neck, and arms. Patient hydrating. Plan to monitor patient for 20 mins to assess for worsening symptoms. Patient agreeable. SPO2 96% on RA. Patient denies chest / throat tightness, difficulty breathing, tongue swelling or itching at this time.     1505  Patient states she is starting to experience tightness in her throat. ER called and this RN told to bring patient to room 34. Patient taken by wheelchair. Hand off report given to ER RN. Patient in stable condition at this time.

## 2024-12-13 NOTE — TELEPHONE ENCOUNTER
RN s/w patient    RN informed pt of Dr Walter' recommendation. Pt agreed to appointment today with Dr Walter. Pt scheduled today at 1 pm.

## 2024-12-13 NOTE — TELEPHONE ENCOUNTER
Patient has problem with breathing / patient doesn't get better . Please call to advice . Encounter routed to triage nurse .

## 2024-12-18 ENCOUNTER — TELEPHONE (OUTPATIENT)
Dept: FAMILY MEDICINE CLINIC | Facility: CLINIC | Age: 44
End: 2024-12-18

## 2024-12-18 NOTE — TELEPHONE ENCOUNTER
Patient's insurance is requesting a medical necessity reason for the Site where the CT Chest has been performed, otherwise the CT will be denied.    [facility] P2P # [phone]  REFERENCE # [id number]  PT'S ID # [id number]    The following is the letter received from [facility]:

## 2024-12-26 ENCOUNTER — OFFICE VISIT (OUTPATIENT)
Dept: FAMILY MEDICINE CLINIC | Facility: CLINIC | Age: 44
End: 2024-12-26
Payer: COMMERCIAL

## 2024-12-26 ENCOUNTER — TELEPHONE (OUTPATIENT)
Dept: FAMILY MEDICINE CLINIC | Facility: CLINIC | Age: 44
End: 2024-12-26

## 2024-12-26 VITALS
DIASTOLIC BLOOD PRESSURE: 70 MMHG | SYSTOLIC BLOOD PRESSURE: 120 MMHG | WEIGHT: 171.19 LBS | BODY MASS INDEX: 29.23 KG/M2 | HEIGHT: 64 IN | OXYGEN SATURATION: 97 % | TEMPERATURE: 98 F | HEART RATE: 59 BPM

## 2024-12-26 DIAGNOSIS — Z76.89 ENCOUNTER FOR WEIGHT MANAGEMENT: ICD-10-CM

## 2024-12-26 DIAGNOSIS — E66.3 OVERWEIGHT (BMI 25.0-29.9): ICD-10-CM

## 2024-12-26 DIAGNOSIS — J45.30 MILD PERSISTENT ASTHMA WITHOUT COMPLICATION (HCC): Primary | ICD-10-CM

## 2024-12-26 DIAGNOSIS — R79.89 ELEVATED TSH: ICD-10-CM

## 2024-12-26 PROCEDURE — 99214 OFFICE O/P EST MOD 30 MIN: CPT | Performed by: STUDENT IN AN ORGANIZED HEALTH CARE EDUCATION/TRAINING PROGRAM

## 2024-12-26 NOTE — TELEPHONE ENCOUNTER
----- Message from Surinder Walter sent at 12/26/2024  2:12 PM CST -----  Regarding: RE: Labs  I am not sure if she contemplated those for Jolene. We can also send the A1c and lipid panel  ----- Message -----  From: Gi Lewis RN  Sent: 12/26/2024   2:04 PM CST  To: Surinder Walter MD; [facility] Clinical Staff  Subject: RE: Labs                                         Did you want to include the labs from August as well?  ----- Message -----  From: Surinder Walter MD  Sent: 12/26/2024   1:03 PM CST  To: [facility] Clinical Staff  Subject: Labs                                             Can we send patient's labs to [facility] in [facility].  Thank you.

## 2024-12-26 NOTE — PROGRESS NOTES
Subjective:   Abigail Puga is a 44 year old female who presents for Follow - Up (Cold, breathing also dicuss CT results)     44-year-old female coming in for follow-up in regards to SOB.   History: felt sick around 11/27/2024.  Given multiple family members with strep, she saw a telehealth provider on 12/2/24 and was prescribed amoxicillin.  Went to immediate care on 12/6/2024 and 12/8/2024 with cough, chest pain and SOB.  At that time chest x-ray was within normal limits and COVID testing was negative.  She was prescribed prednisone 40 mg daily then changed to 60 mg daily with a taper.  Did not immediately start the higher dose of prednisone.  On 12/9/2024 was advised to use the higher dose of steroid (prednisone 60 mg) and was prescribed azithromycin and Symbicort.  She started all those medications the same day of the office visit.    Workup later included getting a CT PE protocol that showed no acute pulmonary embolism.  Patient developed allergic reaction to the contrast dye and was treated in the ED.    Mentions at this time feeling good improvement with no more SOB specially over the past few days.  Using albuterol only on an as-needed basis.  Mentions historically using inhaler similarly when sick.    Patient also would like to discuss weight management.  Currently not working out however states overall eats a healthy diet.  Mentions feeling hungry all the time.  Denies chest pain, SOB, family history of thyroid cancer, h/o cholecystitis, h/o pancreatitis.    History/Other:    Chief Complaint Reviewed and Verified  Nursing Notes Reviewed and   Verified  Tobacco Reviewed  Allergies Reviewed  Medications Reviewed    Problem List Reviewed  Medical History Reviewed  Surgical History   Reviewed  OB Status Reviewed  Family History Reviewed  Social History   Reviewed         Tobacco:  She smoked tobacco in the past but quit greater than 12 months ago.  Social History     Tobacco Use   Smoking Status  Former    Current packs/day: 0.00    Types: Cigarettes    Quit date: 2006    Years since quittin.9    Passive exposure: Past   Smokeless Tobacco Never        Current Outpatient Medications   Medication Sig Dispense Refill    albuterol 108 (90 Base) MCG/ACT Inhalation Aero Soln Inhale 2 puffs into the lungs every 4 (four) hours as needed for Wheezing. 1 each 0    FLUoxetine 10 MG Oral Cap       Multiple Vitamin (MULTIVITAMIN ADULT OR) Take by mouth As Directed.      albuterol 108 (90 Base) MCG/ACT Inhalation Aero Soln Inhale 2 puffs into the lungs every 6 (six) hours as needed for Wheezing. 8.5 g 3    COLLAGEN OR Take by mouth.      Budesonide-Formoterol Fumarate 80-4.5 MCG/ACT Inhalation Aerosol Inhale 2 puffs into the lungs 2 (two) times daily. (Patient not taking: Reported on 2024) 1 each 0    ipratropium-albuterol 0.5-2.5 (3) MG/3ML Inhalation Solution Take 3 mL by nebulization every 6 (six) hours as needed. (Patient not taking: Reported on 2024) 30 each 0    amoxicillin 500 MG Oral Cap Take 1 capsule (500 mg total) by mouth 2 (two) times daily. (Patient not taking: Reported on 2024)      benzonatate 100 MG Oral Cap Take 1 capsule (100 mg total) by mouth 3 (three) times daily as needed for cough. (Patient not taking: Reported on 2024) 30 capsule 0         Review of Systems:  Review of Systems   Constitutional:  Negative for chills, diaphoresis and fever.   HENT:  Negative for congestion, ear discharge, ear pain, sinus pressure, sinus pain and sore throat.    Eyes:  Negative for pain and discharge.   Respiratory:  Negative for cough, chest tightness, shortness of breath and wheezing.    Cardiovascular:  Negative for chest pain and palpitations.   Gastrointestinal:  Negative for abdominal pain, diarrhea, nausea and vomiting.   Endocrine: Negative for cold intolerance and heat intolerance.   Genitourinary:  Negative for dysuria, flank pain, frequency and urgency.   Musculoskeletal:   Negative for joint swelling.   Skin:  Negative for rash.   Neurological:  Negative for dizziness, syncope and headaches.   Psychiatric/Behavioral:  Negative for confusion and hallucinations.        Objective:   /70 (BP Location: Right arm, Patient Position: Sitting, Cuff Size: adult)   Pulse 59   Temp 97.9 °F (36.6 °C) (Oral)   Ht 5' 4\" (1.626 m)   Wt 171 lb 3.2 oz (77.7 kg)   LMP 11/27/2024   SpO2 97%   BMI 29.39 kg/m²  Estimated body mass index is 29.39 kg/m² as calculated from the following:    Height as of this encounter: 5' 4\" (1.626 m).    Weight as of this encounter: 171 lb 3.2 oz (77.7 kg).  Physical Exam  Constitutional:       General: She is not in acute distress.     Appearance: Normal appearance. She is not ill-appearing or toxic-appearing.   HENT:      Head: Normocephalic and atraumatic.   Cardiovascular:      Rate and Rhythm: Normal rate and regular rhythm.      Heart sounds: Normal heart sounds. No murmur heard.     No gallop.   Pulmonary:      Effort: Pulmonary effort is normal. No respiratory distress.      Breath sounds: Normal breath sounds. No stridor. No wheezing, rhonchi or rales.   Abdominal:      General: Bowel sounds are normal.      Palpations: Abdomen is soft.      Tenderness: There is no abdominal tenderness. There is no guarding.   Musculoskeletal:      Cervical back: Normal range of motion and neck supple.   Skin:     General: Skin is warm and dry.   Neurological:      General: No focal deficit present.      Mental Status: She is alert and oriented to person, place, and time. Mental status is at baseline.   Psychiatric:         Mood and Affect: Mood normal.         Behavior: Behavior normal.         Thought Content: Thought content normal.         Judgment: Judgment normal.       CT CHEST PE AORTA (IV ONLY) (CPT=71260)    Result Date: 12/13/2024  CONCLUSION:   1. No acute pulmonary embolism through the subsegmental pulmonary arteries. 2. No pneumonia, pleural effusion, or  pneumothorax. 3. Small airways disease with mucous plugging involving a right lower lobe subsegmental bronchus. 4. Minimal atelectasis involving the lingula and right lower lobe.  5. Lesser incidental findings described above.    Dictated by (CST): Farhad Silva MD on 12/13/2024 at 2:57 PM     Finalized by (CST): Farhad Silva MD on 12/13/2024 at 3:01 PM             Assessment & Plan:   1. Mild persistent asthma without complication (HCC) (Primary)  Improved at this time.  Has not been using Symbicort.  -Albuterol as needed  -     Pulmonary Function Test; Future; Expected date: 12/26/2024  2. Elevated TSH  As noted on 2/13/2024, normal free T4.  -     Assay, Thyroid Stim Hormone  -     Free T4, (Free Thyroxine)  3. Overweight (BMI 25.0-29.9)  Discussed medical management.  -     Referral to Bariatrics  4. Encounter for weight management  -     Referral to Bariatrics        Return for Routine care.    Surinder Walter MD, 12/26/2024, 12:30 PM

## 2025-03-17 ENCOUNTER — TELEMEDICINE (OUTPATIENT)
Dept: FAMILY MEDICINE CLINIC | Facility: CLINIC | Age: 45
End: 2025-03-17
Payer: COMMERCIAL

## 2025-03-17 DIAGNOSIS — R00.2 PALPITATIONS: ICD-10-CM

## 2025-03-17 DIAGNOSIS — E03.9 HYPOTHYROIDISM, UNSPECIFIED TYPE: ICD-10-CM

## 2025-03-17 DIAGNOSIS — N95.1 PERIMENOPAUSE: ICD-10-CM

## 2025-03-17 DIAGNOSIS — R23.3 EASY BRUISING: ICD-10-CM

## 2025-03-17 DIAGNOSIS — Z87.09 HISTORY OF FREQUENT URI: Primary | ICD-10-CM

## 2025-03-17 PROBLEM — D35.2 PITUITARY ADENOMA (HCC): Status: RESOLVED | Noted: 2020-12-12 | Resolved: 2025-03-17

## 2025-03-17 NOTE — PROGRESS NOTES
Due to the real risk of possible exposure to Coronavirus (CoV-2, COVID-19) in the office/medical building and recommendations for social distancing (key to mitigation/limiting the spread of the virus) a Virtual or Telemedicine visit over the phone was performed as below.     Patient has consented to the Virtual/Telephone Check-In service and expresses understanding and accepts financial responsibility for any deductible, co-insurance and/or co-pays associated with this service.    [facility]  Telehealth Verbal Consent   I conducted a telehealth visit with Abigail Puga today, 03/17/25, which was completed using two-way, real-time interactive audio and video communication. This has been done in good sameera to provide continuity of care in the best interest of the provider-patient relationship, due to the COVID -19 public health crisis/national emergency where restrictions of face-to-face office visits are ongoing. Every conscious effort was taken to allow for sufficient and adequate time to complete the visit.  The patient was made aware of the limitations of the telehealth visit, including treatment limitations as no physical exam could be performed.  The patient was advised to call 911 or to go to the ER in case there was an emergency.  The patient was also advised of the potential privacy & security concerns related to the telehealth platform.   The patient was made aware of where to find [facility]'s notice of privacy practices, telehealth consent form and other related consent forms and documents.  which are located on the [facility] website. The patient verbally agreed to telehealth consent form, related consents and the risks discussed.    Lastly, the patient confirmed that they were in [place].   Included in this visit, time may have been spent reviewing labs, medications, radiology tests and decision making. Appropriate medical decision-making and tests are ordered as detailed in the plan of care  above.  Coding/billing information is submitted for this visit based on complexity of care and/or time spent for the visit.    HPI:  Chief Complaint   Patient presents with    Follow - Up     Frequent illness, easy bruising       Abigail Puga is a 45 year old female who calls for complaints of:    Frequent illnesses since October. Reports she has had several URIs, sometimes with fevers. When she is not sick, denies fevers, weight loss. Has some hot/cold flashes at times but relates this to perimenopause. Also notes she bruises easily. Has a few fading bruises now on arms and legs, notes her kids sometimes \"jump on her\" so that may contribute. Denies nosebleeds, bleeding gums, blood in urine/stool. Periods may be heavy on the first day, occasionally soaks a pad or tampon in an hour but not typically.     Hx hypothyroidism, used to take levothyroxine but has not taken that in some time. Requests thyroid labs including Hashimoto's antibodies ordered. Those antibodies were normal in 2024. Requests large lab panel, see [employer] message.    Reports ongoing palpitations. She saw cardiology at [facility] for workup, reports having an echo and Holter done. They told her she has more PVCs \"than is normal\" but did not recommend any treatment. She is wondering how we can treat that.     ROS:  See above.    Physical Exam:  GEN:  Patient is alert, awake and oriented, well developed, well nourished.  LUNGS: Patient speaks clearly in full sentences without dyspnea, no cough while on video visit.  PSYCH: Appropriate mood and affect.    Allergies[1]  Current Outpatient Medications   Medication Sig Dispense Refill    FLUoxetine 10 MG Oral Cap       Multiple Vitamin (MULTIVITAMIN ADULT OR) Take by mouth As Directed.      albuterol 108 (90 Base) MCG/ACT Inhalation Aero Soln Inhale 2 puffs into the lungs every 6 (six) hours as needed for Wheezing. 8.5 g 3    COLLAGEN OR Take by mouth.       Past Medical History:    Abnormal Pap  smear of cervix    BRCA gene mutation negative in female    Negative 54 gene hereditary cancer panel, report in media tab    Endometriosis    Hypothyroidism    no meds     Past Surgical History:   Procedure Laterality Date          LTCS for FTP    Colposcopy, cervix w/upper adjacent vagina; w/biopsy(s), cervix N/A     Laparoscopy,pelvic,biopsy  2016    for endmetriois    Needle biopsy left Left 2021    Needle biopsy right  2022    us guided         ASSESSMENT AND PLAN:   1. Patient is a 45 year old female who calls for: Several concerns    Additional Assessment and Plan:  1. History of frequent URI  -Discussed that it is considered \"typical\" to have up to 6-10 respiratory infections per year, most of which are concentrated between October-March due to cold/flu season.   -Recommended adequate sleep (8-9 hours/night), healthy diet, regular exercise, and stress management to boost immune system function. May use zinc/vitamin C OTC as needed as well. Drink plenty of water.  -No B symptoms, no illnesses requiring hospitalizations.  -She requests IgA, IgG, and IgM testing. Advised we do not typically perform that in primary care. She may follow-up with integrative provider that she sees regularly.  -Advised that many of these requested labs are not likely to be covered by insurance as we do not have a diagnosis to cover them. She verbalized understanding.  - CBC With Differential With Platelet  - Comp Metabolic Panel (14)  - B12 AND FOLATE [7065][Q]  - VITAMIN D, SCREEN [43622][Q]    2. Easy bruising  -No other s/s bleeding reported. Will check labs as below.   - CBC With Differential With Platelet  - Comp Metabolic Panel (14)  - PTT, Activated [E]  - Prothrombin Time (PT) [E]  - B12 AND FOLATE [7065][Q]  - VITAMIN D, SCREEN [71993][Q]    3. Palpitations  -Sees cardiology. Patient sent echo report, reviewed, appears normal.  -Last pulse documented in Epic was 59 bpm in 2024. Discussed  that I would be hesitant to start a beta-blocker due to decrease in heart rate. Recommend in-person follow-up to check vitals and review cardiology reports, recommend she bring those to that visit.     4. Perimenopause  -See above.    5. Hypothyroidism, unspecified type  -Will check thyroid labs.  - TSH and Free T4 [E]  - Free T3 (Triiodothryronine)  - Thyroid Peroxidase (TPO) AB [E]  - Thyroid Antithyroglobulin AB        Follow up with me 6 months, sooner PRN    Call and/or go to the ER if worsening symptoms including, but not limited to: respiratory distress, shortness of breath and  wheezing, worsening fever, cough and mental status.      The patient (or patient's parent if <19 y/o) indicates understanding of the above recommendations and agrees to the above plan.    Orders Placed This Encounter   Procedures    CBC With Differential With Platelet    Comp Metabolic Panel (14)    TSH and Free T4 [E]    Free T3 (Triiodothryronine)    Thyroid Peroxidase (TPO) AB [E]    Thyroid Antithyroglobulin AB    PTT, Activated [E]    Prothrombin Time (PT) [E]    B12 AND FOLATE [7065][Q]    VITAMIN D, SCREEN [58067][Q]       Meds & Refills for this Visit:  Requested Prescriptions      No prescriptions requested or ordered in this encounter       Imaging & Consults:  None    Jolene Mccall, GANESH     Time spent during encounter: 30 minutes             [1]   Allergies  Allergen Reactions    Radiology Contrast Iodinated Dyes HIVES, ITCHING and Tightness in Throat     Allergic reaction first time receiving iodinated contrast on 12/13/24.

## 2025-03-20 LAB
ABSOLUTE BASOPHILS: 53 CELLS/UL (ref 0–200)
ABSOLUTE EOSINOPHILS: 638 CELLS/UL (ref 15–500)
ABSOLUTE LYMPHOCYTES: 2693 CELLS/UL (ref 850–3900)
ABSOLUTE MONOCYTES: 518 CELLS/UL (ref 200–950)
ABSOLUTE NEUTROPHILS: 3600 CELLS/UL (ref 1500–7800)
ALBUMIN/GLOBULIN RATIO: 1.5 (CALC) (ref 1–2.5)
ALBUMIN: 4.1 G/DL (ref 3.6–5.1)
ALKALINE PHOSPHATASE: 62 U/L (ref 31–125)
ALT: 12 U/L (ref 6–29)
AST: 16 U/L (ref 10–30)
BASOPHILS: 0.7 %
BILIRUBIN, TOTAL: 0.3 MG/DL (ref 0.2–1.2)
BUN: 13 MG/DL (ref 7–25)
CALCIUM: 9.3 MG/DL (ref 8.6–10.2)
CARBON DIOXIDE: 27 MMOL/L (ref 20–32)
CHLORIDE: 103 MMOL/L (ref 98–110)
CREATININE: 0.67 MG/DL (ref 0.5–0.99)
EGFR: 110 ML/MIN/1.73M2
EOSINOPHILS: 8.5 %
FOLATE, SERUM: 20.6 NG/ML
GLOBULIN: 2.8 G/DL (CALC) (ref 1.9–3.7)
GLUCOSE: 94 MG/DL (ref 65–99)
HEMATOCRIT: 39.5 % (ref 35–45)
HEMOGLOBIN: 12.4 G/DL (ref 11.7–15.5)
INR: 1
LYMPHOCYTES: 35.9 %
MCH: 28.2 PG (ref 27–33)
MCHC: 31.4 G/DL (ref 32–36)
MCV: 89.8 FL (ref 80–100)
MONOCYTES: 6.9 %
MPV: 9.7 FL (ref 7.5–12.5)
NEUTROPHILS: 48 %
PARTIAL THROMBOPLASTIN$TIME, ACTIVATED: 29 SEC (ref 23–32)
PLATELET COUNT: 415 THOUSAND/UL (ref 140–400)
POTASSIUM: 4.4 MMOL/L (ref 3.5–5.3)
PROTEIN, TOTAL: 6.9 G/DL (ref 6.1–8.1)
PT: 10.9 SEC (ref 9–11.5)
RDW: 12.9 % (ref 11–15)
RED BLOOD CELL COUNT: 4.4 MILLION/UL (ref 3.8–5.1)
SODIUM: 137 MMOL/L (ref 135–146)
T3, FREE: 3.1 PG/ML (ref 2.3–4.2)
T4, FREE: 1 NG/DL (ref 0.8–1.8)
THYROGLOBULIN ANTIBODIES: <1 IU/ML
THYROID PEROXIDASE$ANTIBODIES: 1 IU/ML
TSH: 2.25 MIU/L
VITAMIN B12: 955 PG/ML (ref 200–1100)
VITAMIN D, 25-OH, TOTAL: 42 NG/ML (ref 30–100)
WHITE BLOOD CELL COUNT: 7.5 THOUSAND/UL (ref 3.8–10.8)

## 2025-03-26 DIAGNOSIS — R79.89 ABNORMAL CBC: Primary | ICD-10-CM

## 2025-04-25 ENCOUNTER — OFFICE VISIT (OUTPATIENT)
Dept: SURGERY | Facility: CLINIC | Age: 45
End: 2025-04-25
Payer: COMMERCIAL

## 2025-04-25 VITALS
HEIGHT: 64 IN | WEIGHT: 170.63 LBS | HEART RATE: 62 BPM | DIASTOLIC BLOOD PRESSURE: 59 MMHG | BODY MASS INDEX: 29.13 KG/M2 | SYSTOLIC BLOOD PRESSURE: 113 MMHG

## 2025-04-25 DIAGNOSIS — R63.5 WEIGHT GAIN: ICD-10-CM

## 2025-04-25 DIAGNOSIS — Z01.419 WOMEN'S ANNUAL ROUTINE GYNECOLOGICAL EXAMINATION: ICD-10-CM

## 2025-04-25 DIAGNOSIS — N95.1 PERIMENOPAUSAL SYMPTOMS: Primary | ICD-10-CM

## 2025-04-25 PROCEDURE — 99215 OFFICE O/P EST HI 40 MIN: CPT | Performed by: OBSTETRICS & GYNECOLOGY

## 2025-04-25 PROCEDURE — 87624 HPV HI-RISK TYP POOLED RSLT: CPT | Performed by: OBSTETRICS & GYNECOLOGY

## 2025-04-25 PROCEDURE — 99396 PREV VISIT EST AGE 40-64: CPT | Performed by: OBSTETRICS & GYNECOLOGY

## 2025-04-25 PROCEDURE — 88175 CYTOPATH C/V AUTO FLUID REDO: CPT | Performed by: OBSTETRICS & GYNECOLOGY

## 2025-04-25 NOTE — PROGRESS NOTES
GYN ANNUAL    2025  1:31 PM    Chief Complaint   Patient presents with    Other     Perimenopausal symptoms    Physical     Annual exam and PAP   .    HPI: Patient is a 45 year old  LMP 25 presents for annual gyn exam and PAP with concerns about ongoing perimenopausal symptms. Last visit2023 discussed trial with Ashwagandha and CoQ10  and has been doing well but still experiencing weight gain and libido issues. Had online Functional Medicine labs drawn which need review. Has seen Dr. Hardy in past so contact given to re-establish care. Counseled on use of vaginal hyaluronic acid as well as L-Arginine 2.5-5.0 mg daily for libido support and referred for consultation with Dr. Najera at [facility] . No other gynecologic concerns.       OB History    Para Term  AB Living   3 3 3   3   SAB IAB Ectopic Multiple Live Births       3      # Outcome Date GA Lbr Nick/2nd Weight Sex Type Anes PTL Lv   3 Term 20 40w4d  8 lb 6 oz (3.799 kg) F Vag-Spont   NOÉ   2 Term 18   8 lb 6 oz (3.799 kg) M Vag-Spont   NOÉ   1 Term 02   9 lb 9 oz (4.338 kg) M CS-Unspec   NOÉ         GYN hx:    Hx Prior Abnormal Pap: Yes  Pap Date: 22  Pap Result Notes: Normal pap  Follow Up Recommendation: Mammo 10/12/2024  CONTRACEPTION: None  LAST MAMMOGRAM: 10/2024      Current Medications[1]    Past Medical History[2]  Past Surgical History[3]  Allergies[4]  Family History[5]  Set as collapsible by default.[6]  Social History     Social History Narrative    Not on file       ROS:     Review of Systems:  A comprehensive 10 point ROS was completed. All pertinent positives and negatives noted in the HPI        /59 (BP Location: Right arm, Patient Position: Sitting, Cuff Size: large)   Pulse 62   Ht 64\"   Wt 170 lb 9.6 oz (77.4 kg)   LMP 2025 (Exact Date)   BMI 29.28 kg/m²     Exam:   GENERAL: well developed, well nourished, in no apparent distress  SKIN: no  rashes, no lesions  HEENT: normal  LUNGS: respiration unlabored  CARDIOVASCULAR: no peripheral edema or varicosities, skin warm and dry  BREASTS: bilaterally nontender, no palpable masses, no nipple discharge, no skin changes, no axillary adenopathy  ABDOMEN: Soft, non distended; non tender, no masses  GYNE/:   External Genitalia: normal, no lesions, good perineal support  Urethra: meatus normal  Bladder: well supported  Vagina: normal mucosa, no lesions, no discharge   Uterus: normal size, mobile, nontender  Cervix:  normal os, no lesions or bleeding  Adnexa: normal size, bilaterally nontender, no palpable masses  Cul-de-sac: normal  R/V: normal perineum, no hemorrhoids  EXTREMITIES: nontender without edema      A/P: Patient is 45 year old female here for well-woman exam.     1. Women's annual routine gynecological examination  - PAP today    2. Perimenopausal symptoms  - Refer to Dr. Hardy for Functional labs review  - Start L-arginine 2.5 - 5.0 mg daily for libido support  - Referral given to Dr. Najera at [facility]    3. Weight gain  - Referral placed to [facility]      Total time spent = 45 minutes  >50% = face to face discussion and coordination of care        2025  Reyna Monteiro MD                    [1]   Current Outpatient Medications   Medication Sig Dispense Refill    FLUoxetine 10 MG Oral Cap       Multiple Vitamin (MULTIVITAMIN ADULT OR) Take by mouth As Directed.      albuterol 108 (90 Base) MCG/ACT Inhalation Aero Soln Inhale 2 puffs into the lungs every 6 (six) hours as needed for Wheezing. 8.5 g 3    COLLAGEN OR Take by mouth.     [2]   Past Medical History:   Abnormal Pap smear of cervix    BRCA gene mutation negative in female    Negative 54 gene hereditary cancer panel, report in media tab    Endometriosis    Hypothyroidism    no meds   [3]   Past Surgical History:  Procedure Laterality Date          LTCS for FTP    Colposcopy,  cervix w/upper adjacent vagina; w/biopsy(s), cervix N/A     Laparoscopy,pelvic,biopsy  2016    for endmetriois    Needle biopsy left Left 2021    Needle biopsy right  2022    us guided   [4]   Allergies  Allergen Reactions    Radiology Contrast Iodinated Dyes HIVES, ITCHING and Tightness in Throat     Allergic reaction first time receiving iodinated contrast on 24.    [5]   Family History  Problem Relation Age of Onset    Pancreatic Cancer Mother 55         at 56y    Breast Cancer Other         dx >50y    Melanoma Other         dx >50y    Cancer Other         esophageal cancer, dx >50y    Schizophrenia Father     Stroke Maternal Grandmother         ?    Schizophrenia Paternal Uncle     Diabetes Neg     Glaucoma Neg     Macular degeneration Neg    [6]   Social History  Socioeconomic History    Marital status:    Tobacco Use    Smoking status: Former     Current packs/day: 0.00     Types: Cigarettes     Quit date:      Years since quittin.3     Passive exposure: Past    Smokeless tobacco: Never   Vaping Use    Vaping status: Never Used   Substance and Sexual Activity    Alcohol use: Yes     Comment: Social use    Drug use: Never    Sexual activity: Yes     Partners: Male     Birth control/protection: Vasectomy

## 2025-04-28 LAB — HPV E6+E7 MRNA CVX QL NAA+PROBE: NEGATIVE

## 2025-06-19 ENCOUNTER — TELEMEDICINE (OUTPATIENT)
Dept: FAMILY MEDICINE CLINIC | Facility: CLINIC | Age: 45
End: 2025-06-19
Payer: COMMERCIAL

## 2025-06-19 DIAGNOSIS — D72.10 EOSINOPHILIA, UNSPECIFIED TYPE: ICD-10-CM

## 2025-06-19 DIAGNOSIS — R76.8 POSITIVE ANA (ANTINUCLEAR ANTIBODY): Primary | ICD-10-CM

## 2025-06-19 DIAGNOSIS — Z12.83 SCREENING FOR MALIGNANT NEOPLASM OF SKIN: ICD-10-CM

## 2025-06-19 DIAGNOSIS — D50.9 IRON DEFICIENCY ANEMIA, UNSPECIFIED IRON DEFICIENCY ANEMIA TYPE: ICD-10-CM

## 2025-06-19 DIAGNOSIS — L98.9 SKIN LESION: ICD-10-CM

## 2025-06-19 PROCEDURE — 99214 OFFICE O/P EST MOD 30 MIN: CPT | Performed by: STUDENT IN AN ORGANIZED HEALTH CARE EDUCATION/TRAINING PROGRAM

## 2025-06-19 RX ORDER — BUPROPION HYDROCHLORIDE 150 MG/1
150 TABLET ORAL
COMMUNITY
Start: 2025-06-15

## 2025-06-19 NOTE — PROGRESS NOTES
This is a telemedicine visit with live, interactive video and audio.     Patient understands and accepts financial responsibility for any deductible, co-insurance and/or co-pays associated with this service.    SUBJECTIVE    45-year-old female calling to follow-up after labs that she completed through a functional health program.  Her WALDO came back positive with titers of 1:40 however with no reflex testing.  H/H 11.6/31.4.  Elevated eosinophils at 512.  Elevated hs-CRP at 9.4 low iron with ferritin at 20.  Trace blood was noted in the urine however patient was on menstrual cycle.  After completing her labs, patient started taking iron glycinate/aspartate 15 mg.  Patient also complains of noticing different spots on her skin that almost look like scratch marks.  Some are sore to touch.  Never had any skin screening examination with dermatology.  Denies chest pain, SOB, dysuria, hematuria, dysphagia.    HISTORY:  Past Medical History[1]   Past Surgical History[2]   Family History[3]   Short Social Hx on File[4]     Allergies[5]   Current Medications[6]    OBJECTIVE  Physical Exam:   alert, appears stated age, cooperative, and no distress, Speaking in full sentences comfortably, and Normal work of breathing    ASSESSMENT & PLAN    1. Positive WALDO (antinuclear antibody) (Primary)  Reviewed labs from outside institution.  -Order for reflex studies.  -     Anti-Nuclear Antibody (WALDO) by IFA, Reflex Titer + Specific Antibodies; Future; Expected date: 06/19/2025  2. Iron deficiency anemia, unspecified iron deficiency anemia type  After completing her labs, patient started taking iron glycinate/aspartate 15 mg.  Iron generally should not be given with food and should be taken separately from calcium-containing foods and beverages (milk), calcium supplements, cereals, dietary fiber, tea, coffee, and eggs.    We can repeat your blood counts 12 weeks after starting the iron supplements.  -     Iron And Tibc  -     Ferritin  -      CBC, Platelet; No Differential  3. Eosinophilia, unspecified type  Also noted on historical labs back to .  Absolute eosinophil count (AEC) never >1500/microL   -     MD Blood Smear consult [E]; Future; Expected date: 2025  4. Skin lesion  -     DERM - INTERNAL  5. Screening for malignant neoplasm of skin  -     DERM - INTERNAL      Return in about 3 months (around 2025).    Surinder Walter MD           [1]   Past Medical History:   Abnormal Pap smear of cervix    BRCA gene mutation negative in female    Negative 54 gene hereditary cancer panel, report in media tab    Endometriosis    Hypothyroidism    no meds   [2]   Past Surgical History:  Procedure Laterality Date          LTCS for FTP    Colposcopy, cervix w/upper adjacent vagina; w/biopsy(s), cervix N/A     Laparoscopy,pelvic,biopsy      for endmetriois    Needle biopsy left Left 2021    Needle biopsy right  2022    us guided   [3]   Family History  Problem Relation Age of Onset    Pancreatic Cancer Mother 55         at 56y    Breast Cancer Other         dx >50y    Melanoma Other         dx >50y    Cancer Other         esophageal cancer, dx >50y    Schizophrenia Father     Stroke Maternal Grandmother         ?    Schizophrenia Paternal Uncle     Diabetes Neg     Glaucoma Neg     Macular degeneration Neg    [4]   Social History  Socioeconomic History    Marital status:    Tobacco Use    Smoking status: Former     Current packs/day: 0.00     Types: Cigarettes     Quit date:      Years since quittin.4     Passive exposure: Past    Smokeless tobacco: Never   Vaping Use    Vaping status: Never Used   Substance and Sexual Activity    Alcohol use: Yes     Comment: Social use    Drug use: Never    Sexual activity: Yes     Partners: Male     Birth control/protection: Vasectomy     Social Drivers of Health     Food Insecurity: No Food Insecurity (2025)    NCSS - Food Insecurity     Worried About  Running Out of Food in the Last Year: No     Ran Out of Food in the Last Year: No   Transportation Needs: No Transportation Needs (4/25/2025)    NCSS - Transportation     Lack of Transportation: No   Housing Stability: Not At Risk (4/25/2025)    NCSS - Housing/Utilities     Has Housing: Yes     Worried About Losing Housing: No     Unable to Get Utilities: No   [5]   Allergies  Allergen Reactions    Radiology Contrast Iodinated Dyes HIVES, ITCHING and Tightness in Throat     Allergic reaction first time receiving iodinated contrast on 12/13/24.    [6]   Current Outpatient Medications   Medication Sig Dispense Refill    buPROPion  MG Oral Tablet 24 Hr Take 1 tablet (150 mg total) by mouth.      FLUoxetine 10 MG Oral Cap       Multiple Vitamin (MULTIVITAMIN ADULT OR) Take by mouth As Directed.      albuterol 108 (90 Base) MCG/ACT Inhalation Aero Soln Inhale 2 puffs into the lungs every 6 (six) hours as needed for Wheezing. 8.5 g 3    COLLAGEN OR Take by mouth.

## 2025-06-21 ENCOUNTER — LAB ENCOUNTER (OUTPATIENT)
Dept: LAB | Age: 45
End: 2025-06-21
Attending: STUDENT IN AN ORGANIZED HEALTH CARE EDUCATION/TRAINING PROGRAM
Payer: COMMERCIAL

## 2025-06-21 DIAGNOSIS — R76.8 POSITIVE ANA (ANTINUCLEAR ANTIBODY): ICD-10-CM

## 2025-06-21 DIAGNOSIS — D72.10 EOSINOPHILIA, UNSPECIFIED TYPE: ICD-10-CM

## 2025-06-21 PROCEDURE — 85027 COMPLETE CBC AUTOMATED: CPT | Performed by: STUDENT IN AN ORGANIZED HEALTH CARE EDUCATION/TRAINING PROGRAM

## 2025-06-21 PROCEDURE — 86038 ANTINUCLEAR ANTIBODIES: CPT

## 2025-06-21 PROCEDURE — 36415 COLL VENOUS BLD VENIPUNCTURE: CPT

## 2025-06-21 PROCEDURE — 85060 BLOOD SMEAR INTERPRETATION: CPT

## 2025-06-21 PROCEDURE — 85025 COMPLETE CBC W/AUTO DIFF WBC: CPT | Performed by: STUDENT IN AN ORGANIZED HEALTH CARE EDUCATION/TRAINING PROGRAM

## 2025-06-21 NOTE — TELEPHONE ENCOUNTER
Pts monitor showing pt Bradycardiac in the 40s NP Sonia gutierrez notified   Today our office received a FAX from [address] [place] [address]. I forward it to Dr Doug Marks for review. See attached document.

## 2025-06-23 ENCOUNTER — MOBILE ENCOUNTER (OUTPATIENT)
Dept: FAMILY MEDICINE CLINIC | Facility: CLINIC | Age: 45
End: 2025-06-23

## 2025-06-23 DIAGNOSIS — D72.10 EOSINOPHILIA, UNSPECIFIED TYPE: Primary | ICD-10-CM

## 2025-06-23 LAB
BASOPHILS # BLD AUTO: 0.04 X10(3) UL (ref 0–0.2)
BASOPHILS NFR BLD AUTO: 0.5 %
DEPRECATED RDW RBC AUTO: 43.8 FL (ref 35.1–46.3)
EOSINOPHIL # BLD AUTO: 0.41 X10(3) UL (ref 0–0.7)
EOSINOPHIL NFR BLD AUTO: 5.6 %
ERYTHROCYTE [DISTWIDTH] IN BLOOD BY AUTOMATED COUNT: 13.3 % (ref 11–15)
HCT VFR BLD AUTO: 38.3 % (ref 35–48)
HGB BLD-MCNC: 12.3 G/DL (ref 12–16)
IMM GRANULOCYTES # BLD AUTO: 0.01 X10(3) UL (ref 0–1)
IMM GRANULOCYTES NFR BLD: 0.1 %
LYMPHOCYTES # BLD AUTO: 2.1 X10(3) UL (ref 1–4)
LYMPHOCYTES NFR BLD AUTO: 28.5 %
MCH RBC QN AUTO: 28.9 PG (ref 26–34)
MCHC RBC AUTO-ENTMCNC: 32.1 G/DL (ref 31–37)
MCV RBC AUTO: 89.9 FL (ref 80–100)
MONOCYTES # BLD AUTO: 0.49 X10(3) UL (ref 0.1–1)
MONOCYTES NFR BLD AUTO: 6.7 %
NEUTROPHILS # BLD AUTO: 4.31 X10 (3) UL (ref 1.5–7.7)
NEUTROPHILS # BLD AUTO: 4.31 X10(3) UL (ref 1.5–7.7)
NEUTROPHILS NFR BLD AUTO: 58.6 %
NUCLEAR IGG TITR SER IF: NEGATIVE {TITER}
PLATELET # BLD AUTO: 422 10(3)UL (ref 150–450)
PLATELETS.RETICULATED NFR BLD AUTO: 2.1 % (ref 0–7)
RBC # BLD AUTO: 4.26 X10(6)UL (ref 3.8–5.3)
WBC # BLD AUTO: 7.4 X10(3) UL (ref 4–11)

## (undated) DIAGNOSIS — J45.21 MILD INTERMITTENT ASTHMA WITH ACUTE EXACERBATION: ICD-10-CM

## (undated) NOTE — LETTER
December 12, 2020    Mitali Wolfe DO  [address]  [address] [id number]     Patient: Ele Sneed   YOB: 1980   Date of Visit: 12/12/2020       Dear Dr. Eliceo Alvarado DO:    Thank you for referring Joana Dsouza to me for • Cancer Mother    • Pancreatic Cancer Mother    • Diabetes Neg    • Glaucoma Neg    • Macular degeneration Neg        Social History: Social History    Tobacco Use      Smoking status: Former Smoker        Types: Cigarettes        Quit date: 2006 Y Iris Normal Normal    Lens Clear Clear    Vitreous Clear Clear          Fundus Exam       Right Left    Disc Good rim, Temporal crescent- no swelling  Good rim, Temporal crescent- no swelling     C/D Ratio 0.25 0.25    Macula Normal Normal    Vessels Tort

## (undated) NOTE — LETTER
[facility], [place], [place]  [address]  [facility] [address]  Dept: [phone]    12/18/2024    RE: Abigail Puga         Medical Record: [id number]         YOB: 1980    To Whom It May Concern:    I am writing this letter on behalf of Abigail Puga (a 44 year old female) for approval of the CT CHEST PE AORTA (IV ONLY) (CPT=71260). This exam was performed at [facility] on December 13, 2024.     Abigail Puga was required to have this exam at [facility] given that it was a STAT order for which we were able to accommodate patient at our affiliated hospital vs at outside/third-party institutions.    Separately, retrospectively patient had an anaphylactic reaction to the contrast media and given that she was already in a hospital setting, she benefited from immediately proceeding to the emergency department.      Please let us know if you have any questions or require any further information.        Sincerely,          Surinder Walter MD

## (undated) NOTE — LETTER
03/23/20        Prakash Andersen  [address] [place] [address]  [place] [address]      Dear Kat Jamil,    Our records indicate that you have outstanding lab work and or testing that was ordered for you and has not yet been completed:  Orders Placed This Encounter

## (undated) NOTE — LETTER
01/27/20        [address]  [place] [address]      Dear Jonathan Adhikari,    Our records indicate that you have outstanding lab work and or testing that was ordered for you and has not yet been completed:  Orders Placed This Encounter

## (undated) NOTE — LETTER
[facility], [place]  Authorization for Invasive Procedures  1. I hereby authorize Dr. Brando Canas , my physician and whomever may be designated as the doctor's assistant, to perform the following operation and/or procedure:  Ultrasound guided right breast biopsy with clip placement on [facility] [place] at [facility].    2. My physician has explained to me the nature and purpose of the operation or other procedure, possible alternative methods of treatment, the risks involved and the possibility of complications to me. I understand the probable consequences of declining the recommended procedure and the alternative methods of treatment. I acknowledge that no guarantee has been made as to the result that may be obtained. 3. I recognize that during the course of this operation or other procedure, unforeseen conditions may necessitate additional or different procedures than those listed above. I, therefore, further authorize and request that the above-named physician, his/her physician assistants, or designees perform such procedures as are, in his/her professional opinion, necessary and desirable. If I have a Do Not Attempt Resuscitation (DNAR) order in place, that status will be suspended while in the operating room, procedural suite, and during the recovery period unless otherwise explicitly stated by me (or a person authorized to consent on my behalf). The surgeon or my attending physician will determine when the applicable recovery period ends for purposes of reinstating the DNAR order. 4. Should the need arise during my operation or immediate post-operative period; I also consent to the administration of blood and/or blood products.  Further, I understand that despite careful testing and screening of blood and blood products, I may still be subject to ill effects as a result of recieving a blood transfusion an/or blood producst. The following are some, but not all, of the potential risks that can occur: fever and allergic reactions, hemolytic reactions, transmission of disease such as hepatitis, AIDS, cytomegalovirus (CMV), and flluid overload. In the event that I wish to have autologous transfusions of my own blood, or a directed donor transfusion, I will discuss this with my physician. 5. I consent to the photographing of the operations or procedures to be performed for the purposes of advancing medicine, science, and/or education, provided my identity is not revealed. If the procedure has been videotaped, the physician/surgeon will obtain the original videotape. [facility] will not be responsible for storage or maintenance of this tape. 6. I consent to the presence of a  or observer as deemed necessary by my physician or his designee. 7. Any tissues or organs removed in the operation or other procedure may be disposed of by and at the discretion of [facility].    8. I understand that the physician and his/her physician assistants may not be employees or agents of [facility], [facility], nor [facility], but are independent medical practitioners who have been permitted to use its facilities for the care and treatment of their patients. 9. Patients having a sterilization procedure: I understand that if the procedure is successful the results will be permanent and it will therefore be impossible for me to inseminate, conceive or bear children. I also understand that the procedure is intended to result in sterility, although the result has not been guaranteed. 10. I CERTIFY THAT I HAVE READ AND FULLY UNDERSTAND THE ABOVE CONSENT TO OPERATION and/or OTHER PROCEDURE. 11. I acknowledge that my physician has explained sedation/analgesia administration to me including the risks and benefits.  I consent to the administration of sedation/analgesia as may be necessary or desirable in the judgment of my physician. Signature of Patient:  ________________________________________________ Date: _________Time: _________    Responsible person in case of minor or unconscious: _____________________________Relationship: ____________     Witness Signature: ____________________________________________ Date: __________ Time: ___________    Statement of Physician  My signature below affirms that prior to the time of the procedure, I have explained to the patient and/or her legal representative, the risks and benefits involved in the proposed treatment and any reasonable alternative to the proposed treatment. I have also explained the risks and benefits involved in the refusal of the proposed treatment and have answered the patient's questions. If I have a significant financial interest in this procedure/surgery, I have disclosed this and had a discussion with my patient.     Signature of Physician:   ________________________________________Date: _________Time:_______ Patient Name: Terri Vanegas  : 1/3/1980   Printed: 2022    Medical Record #: [id number]